# Patient Record
Sex: FEMALE | Race: WHITE | Employment: FULL TIME | URBAN - METROPOLITAN AREA
[De-identification: names, ages, dates, MRNs, and addresses within clinical notes are randomized per-mention and may not be internally consistent; named-entity substitution may affect disease eponyms.]

---

## 2017-09-22 ENCOUNTER — HOSPITAL ENCOUNTER (OUTPATIENT)
Dept: ULTRASOUND IMAGING | Age: 32
Discharge: HOME | End: 2017-09-22
Attending: FAMILY MEDICINE
Payer: COMMERCIAL

## 2017-09-22 DIAGNOSIS — R10.9 ABDOMINAL PAIN, UNSPECIFIED SITE: ICD-10-CM

## 2017-09-22 PROCEDURE — 76705 ECHO EXAM OF ABDOMEN: CPT

## 2017-09-26 ENCOUNTER — OFFICE VISIT (OUTPATIENT)
Dept: NEUROLOGY | Age: 32
End: 2017-09-26

## 2017-09-26 VITALS
TEMPERATURE: 97.4 F | BODY MASS INDEX: 26.08 KG/M2 | HEART RATE: 71 BPM | DIASTOLIC BLOOD PRESSURE: 70 MMHG | WEIGHT: 147.2 LBS | HEIGHT: 63 IN | RESPIRATION RATE: 18 BRPM | OXYGEN SATURATION: 99 % | SYSTOLIC BLOOD PRESSURE: 100 MMHG

## 2017-09-26 DIAGNOSIS — R51.9 CHRONIC DAILY HEADACHE: ICD-10-CM

## 2017-09-26 DIAGNOSIS — G43.011 INTRACTABLE MIGRAINE WITHOUT AURA AND WITH STATUS MIGRAINOSUS: Primary | ICD-10-CM

## 2017-09-26 DIAGNOSIS — Z86.69 HISTORY OF SEIZURES AS A CHILD: ICD-10-CM

## 2017-09-26 RX ORDER — ALBUTEROL SULFATE 0.83 MG/ML
SOLUTION RESPIRATORY (INHALATION) ONCE
COMMUNITY
End: 2017-10-19

## 2017-09-26 RX ORDER — TOPIRAMATE 25 MG/1
TABLET ORAL
Qty: 70 TAB | Refills: 0 | Status: SHIPPED | OUTPATIENT
Start: 2017-09-26 | End: 2017-11-02

## 2017-09-26 RX ORDER — TOPIRAMATE 100 MG/1
100 TABLET, FILM COATED ORAL
Qty: 30 TAB | Refills: 3 | Status: SHIPPED | OUTPATIENT
Start: 2017-10-20 | End: 2017-11-02

## 2017-09-26 RX ORDER — SUMATRIPTAN 100 MG/1
TABLET, FILM COATED ORAL
Qty: 9 TAB | Refills: 2 | Status: SHIPPED | OUTPATIENT
Start: 2017-09-26 | End: 2018-01-25 | Stop reason: ALTCHOICE

## 2017-09-26 NOTE — PATIENT INSTRUCTIONS
Stop all over the counter analgesics for headache control. Start Topamax. Start with 25 mg nightly for a week, then 50 mg nightly for a week, then 75 mg nightly for a week, then increase to 100 mg nightly thereafter. I have sent a prescription for the 100 mg tablets to be picked up next month. Imitrex prescription sent to pharmacy. Have report of MRI sent to the office. Complete tests and follow up after. Call office with any concerns.

## 2017-09-26 NOTE — MR AVS SNAPSHOT
Visit Information Date & Time Provider Department Dept. Phone Encounter #  
 9/26/2017  8:30 AM Earl Schilder, NP [facility] [phone] [id number] Upcoming Health Maintenance Date Due INFLUENZA AGE 9 TO ADULT 8/1/2017 PAP AKA CERVICAL CYTOLOGY 11/10/2017 DTaP/Tdap/Td series (2 - Td) 9/27/2024 Allergies as of 9/26/2017  Review Complete On: 9/26/2017 By: Bert Escalera LPN Severity Noted Reaction Type Reactions Latex  09/25/2014    Contact Dermatitis Dilantin [Phenytoin Sodium Extended] High 07/03/2013   Systemic Anaphylaxis Phenobarbital High 07/03/2013   Systemic Anaphylaxis Current Immunizations  Never Reviewed Name Date Tdap 9/27/2014 12:04 PM  
  
 Not reviewed this visit You Were Diagnosed With   
  
 Codes Comments Intractable migraine without aura and with status migrainosus    -  Primary ICD-10-CM: R29.157 
ICD-9-CM: 346.13 History of seizures as a child     ICD-10-CM: Z86.69 
ICD-9-CM: V12.49 Chronic daily headache     ICD-10-CM: R51 ICD-9-CM: 085. 0 Vitals BP Pulse Temp Resp Height(growth percentile) Weight(growth percentile) 100/70 71 97.4 °F (36.3 °C) (Temporal) 18 5' 3\" (1.6 m) 147 lb 3.2 oz (66.8 kg) LMP SpO2 Breastfeeding? BMI OB Status Smoking Status 08/08/2017 (Exact Date) 99% Unknown 26.08 kg/m2 Unknown Never Smoker BMI and BSA Data Body Mass Index Body Surface Area 26.08 kg/m 2 1.72 m 2 Preferred Pharmacy Pharmacy Name Phone [facility], [address] [place] [address] [phone] Your Updated Medication List  
  
   
This list is accurate as of: 9/26/17  9:09 AM.  Always use your most recent med list.  
  
  
  
  
 albuterol 2.5 mg /3 mL (0.083 %) nebulizer solution Commonly known as:  PROVENTIL VENTOLIN  
by Nebulization route once. OTHER Birth control pills---Lo lo estrin prenatal vit-calcium-iron-fa 27 mg iron- 1 mg Tab Commonly known as:  PRENATAL PLUS (CALCIUM CARB) Take 1 Tab by mouth daily. SUMAtriptan 100 mg tablet Commonly known as:  IMITREX Take one tablet at HA onset may repeat > 2 hours if needed. Max 2 tabs in 24 hours * topiramate 25 mg tablet Commonly known as:  TOPAMAX Take 25 mg qhs x 7 days, then take 50 mg qhs x 7 days, then take 75 mg qhs x 7 days, then take 100 mg qhs thereafter. * topiramate 100 mg tablet Commonly known as:  TOPAMAX Take 1 Tab by mouth nightly. Start taking on:  10/20/2017 * Notice: This list has 2 medication(s) that are the same as other medications prescribed for you. Read the directions carefully, and ask your doctor or other care provider to review them with you. Prescriptions Sent to Pharmacy Refills  
 topiramate (TOPAMAX) 25 mg tablet 0 Sig: Take 25 mg qhs x 7 days, then take 50 mg qhs x 7 days, then take 75 mg qhs x 7 days, then take 100 mg qhs thereafter. Class: Normal  
 Pharmacy: [place] [address] [employer] Ph #: [phone]  
 topiramate (TOPAMAX) 100 mg tablet 3 Starting on: 10/20/2017 Sig: Take 1 Tab by mouth nightly. Class: Normal  
 Pharmacy: [address] Ph #: [phone] Route: Oral  
 SUMAtriptan (IMITREX) 100 mg tablet 2 Sig: Take one tablet at HA onset may repeat > 2 hours if needed. Max 2 tabs in 24 hours Class: Normal  
 Pharmacy: [address] Ph #: [phone] To-Do List   
 09/26/2017 Neurology:  EEG SLEEP DEPRIVED   
  
 09/26/2017 Neurology:  EEG Patient Instructions Stop all over the counter analgesics for headache control. Start Topamax. Start with 25 mg nightly for a week, then 50 mg nightly for a week, then 75 mg nightly for a week, then increase to 100 mg nightly thereafter. I have sent a prescription for the 100 mg tablets to be picked up next month. Imitrex prescription sent to pharmacy. Have report of MRI sent to the office. Complete tests and follow up after. Call office with any concerns. Introducing [facility]! Dear Noah: Thank you for requesting a [employer] account. Our records indicate that you already have an active [employer] account. You can access your account anytime at [email or web address] Did you know that you can access your hospital and ER discharge instructions at any time in [employer]? You can also review all of your test results from your hospital stay or ER visit. Additional Information If you have questions, please visit the Frequently Asked Questions section of the [employer] website at [email or web address] Remember, [employer] is NOT to be used for urgent needs. For medical emergencies, dial 911. Now available from your iPhone and Android! Please provide this summary of care documentation to your next provider. Your primary care clinician is listed as Meghan White. If you have any questions after today's visit, please call [phone].

## 2017-09-26 NOTE — PROGRESS NOTES
[facility]  [address], [address], [place], [address]  [address] [place], [address]  Office:  [phone]  Fax: [phone]    Referring: Chema St MD    Chief Complaint   Patient presents with   [facility] Establish Care     NP: headaches. Patient states that she has been getting headaches everyday for the past 2 mos. This is a 27year old female who presents with headaches. Headaches started several months ago. She endorses having a headache most days. She can have 2-3 headache free days a week. She endorses a history of migraine headaches starting when she was a teenager. Today she described a headache that can  Be located bifrontal and this can radiate across the top of her head. She described the pain as a pulsating pain. This pain can increase to a pressure along with pulsating. The left side of the head is affected more than the right side. Associated with pain surrounding her left eye. She endorses light sensitivity. Denies sensitivity to sound. Endorses nausea. Denies vomiting. Denies focal deficits. When she would get headaches in the past Excedrin would work. She said she is not taking anything routinely over the counter because she does not like taking medications and it does not help. She has tried Advil PM but thinks this just makes her sleepy more than relieving headache pain. When she was a teenager she was placed on a migraine preventative. She remembers being on Topamax. Denies ill side effects and said she thinks she tolerated this well. She was recently given a prescription for Procardia two weeks by her PCP. She took this for two days and it made her feel \"off\" and she could not focus. She stopped taking this and notified her PCP's nurse. Pertinent history of seizures as a child. She said these started when she was in . She was given a diagnosis of epilepsy. She said seizure came on suddenly.  She would get an aura and have starring spells. She could hear and see during these episodes. She also recalls freezing spells in which she would get up and her body would stop. She denies overtly convulsing with those episodes, but said she would feel like she was shaking on the inside. She was finally given a diagnosis of epilepsy secondary to an episode of generalized convulsions in which she could not hear or see what was going on during this seizure. She was given Dilantin and phenobarbital which she did not tolerate. She was eventually placed on Tegretol. Unsure the dose. She was on for awhile and then eventually taken off. Seizures eventually stopped after 4-5 years. 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 \" She is unsure exactly what this lesion was, but was told this is what caused her seizures. She denies family history of seizures or history of head trauma. Currently denies occult seizure symptoms. She recently had an MRI W WO contrast done of the brain she does not have the report with her in office today. This was ordered by her PCP. She denies having EEGs done for many years. Past Medical History:   Diagnosis Date    Asthma     Neurological disorder        Past Surgical History:   Procedure Laterality Date    HX HEENT      tonsilectomy    HX OTHER SURGICAL      HX WISDOM TEETH EXTRACTION  2002       Current Outpatient Prescriptions   Medication Sig Dispense Refill    OTHER Birth control pills---Lo lo estrin      albuterol (PROVENTIL VENTOLIN) 2.5 mg /3 mL (0.083 %) nebulizer solution by Nebulization route once.  topiramate (TOPAMAX) 25 mg tablet Take 25 mg qhs x 7 days, then take 50 mg qhs x 7 days, then take 75 mg qhs x 7 days, then take 100 mg qhs thereafter. 70 Tab 0    [START ON 10/20/2017] topiramate (TOPAMAX) 100 mg tablet Take 1 Tab by mouth nightly. 30 Tab 3    SUMAtriptan (IMITREX) 100 mg tablet Take one tablet at HA onset may repeat > 2 hours if needed.  Max 2 tabs in 24 hours 9 Tab 2    prenatal vit-calcium-iron-fa (PRENATAL PLUS, CALCIUM CARB,) 27 mg iron- 1 mg tab Take 1 Tab by mouth daily. 100 Tab 10        Allergies   Allergen Reactions    Latex Contact Dermatitis    Dilantin [Phenytoin Sodium Extended] Anaphylaxis    Phenobarbital Anaphylaxis       Social History   Substance Use Topics    Smoking status: Never Smoker    Smokeless tobacco: Never Used    Alcohol use No       Family History   Problem Relation Age of Onset    Diabetes Mother     Diabetes Maternal Grandmother     Cancer Maternal Grandmother     Stroke Paternal Grandmother     Diabetes Maternal Aunt        Review of Systems:  Pertinent positives and negatives as noted otherwise comprehensive review is negative. Physical Examination:    Visit Vitals    /70    Pulse 71    Temp 97.4 °F (36.3 °C) (Temporal)    Resp 18    Ht 5' 3\" (1.6 m)    Wt 66.8 kg (147 lb 3.2 oz)    SpO2 99%    Breastfeeding Unknown    BMI 26.08 kg/m2     General:  Well defined, nourished, and groomed individual in no acute distress. Neck: Supple, nontender, no bruits. Heart: Regular rate and rhythm, no murmurs, rub, or gallop. Normal S1S2. Lungs:  Clear to auscultation bilaterally with occasional dry cough  Musculoskeletal:  Extremities revealed no edema. Psych:  Good mood and bright affect    Neurological Examination:     Mental Status:   Alert and oriented to person, place, and time with recent and remote memory intact. Attention span and concentration are normal. Speech is fluent with a full fund of knowledge. Cranial Nerves:    II, III, IV, VI:  Visual acuity grossly intact. Visual fields are normal.    Pupils are equal, round, and reactive to light and accommodation. Extra-ocular movements are full and fluid. Fundoscopic exam was benign, no ptosis or nystagmus. V-XII: Hearing is grossly intact. Facial features are symmetric. The palate rises symmetrically and the tongue protrudes midline. Sternocleidomastoids 5/5. Motor Examination: Normal tone, bulk, and strength, 5/5 muscle strength throughout. No cogwheel rigidity or clonus present. Sensory exam:  Sensory examination is intact to primary modalities and there is no lateralization of sensation. Coordination:  Finger to nose was normal.   No resting or intention tremor    Gait and Station:  Steady gait. Normal arm swing. No Rhomberg or pronator drift. No muscle wasting or fasiculations noted. Reflexes:  DTRs 2+ throughout. Toes downgoing. Impression/Plan  Sarah Song is a 27 y.o. female whose history and physical are consistent with migraine and history of seizure in childhood. Patient endorses nearly daily headaches. Tolerated Topamax in the past.  We will start this in a customary fashion with goal of 100 mg qhs. Imitrex PRN. Discussed medications, indication, side effects, and dosing. Patient verbalized understanding. Patient described history of seizure. Obtain records or recent MRI of the brain. Obtain routine and sleep deprived EEGs. We will follow up after tests. Call office with any concerns. Diagnoses and all orders for this visit:    1. Intractable migraine without aura and with status migrainosus  -     EEG; Future  -     EEG SLEEP DEPRIVED; Future    2. History of seizures as a child  -     EEG; Future  -     EEG SLEEP DEPRIVED; Future    3. Chronic daily headache  -     EEG; Future  -     EEG SLEEP DEPRIVED; Future    Other orders  -     topiramate (TOPAMAX) 25 mg tablet; Take 25 mg qhs x 7 days, then take 50 mg qhs x 7 days, then take 75 mg qhs x 7 days, then take 100 mg qhs thereafter.  -     topiramate (TOPAMAX) 100 mg tablet; Take 1 Tab by mouth nightly. -     SUMAtriptan (IMITREX) 100 mg tablet; Take one tablet at HA onset may repeat > 2 hours if needed. Max 2 tabs in 24 hours      Signed By: Jalen Evans NP    This note will not be viewable in [address]     This note was created using voice recognition software. Despite editing, there may be syntax errors.

## 2017-10-03 ENCOUNTER — TELEPHONE (OUTPATIENT)
Dept: NEUROLOGY | Age: 32
End: 2017-10-03

## 2017-10-04 ENCOUNTER — HOSPITAL ENCOUNTER (OUTPATIENT)
Dept: NEUROLOGY | Age: 32
Discharge: HOME | End: 2017-10-04
Attending: NURSE PRACTITIONER
Payer: COMMERCIAL

## 2017-10-04 ENCOUNTER — TELEPHONE (OUTPATIENT)
Dept: NEUROLOGY | Age: 32
End: 2017-10-04

## 2017-10-04 DIAGNOSIS — C71.1 GLIOMA OF FRONTAL LOBE (HCC): Primary | ICD-10-CM

## 2017-10-04 DIAGNOSIS — Z86.69 HISTORY OF SEIZURES AS A CHILD: ICD-10-CM

## 2017-10-04 DIAGNOSIS — R51.9 CHRONIC DAILY HEADACHE: ICD-10-CM

## 2017-10-04 DIAGNOSIS — G43.011 INTRACTABLE MIGRAINE WITHOUT AURA AND WITH STATUS MIGRAINOSUS: ICD-10-CM

## 2017-10-04 PROCEDURE — 95819 EEG AWAKE AND ASLEEP: CPT

## 2017-10-04 NOTE — TELEPHONE ENCOUNTER
Spoke to patient and informed her that she is being [facility] Dr. Ren Oswald. Patient verbalized understanding of the information given.

## 2017-10-04 NOTE — TELEPHONE ENCOUNTER
----- Message from Haley Garza NP sent at 10/4/2017  8:24 AM EDT -----        I will send Ms. Ayon for evaluation by Dr. Brian Escobedo for known brain lesion.       Thank you

## 2017-10-05 NOTE — PROCEDURES
[place]    Name:  Kait Clay  MR#:  [id number]  :  1985  Account #:  [de-identified]  Date of Adm:  10/04/2017  Date of Service:  10/04/2017      INDICATIONS: A 39-year-old female who presents from Dr. Alize Mccallum in terms of electrocortical evaluation complaining of  headaches. CURRENT MEDICATIONS:  1. Prednisone. 2. Albuterol. 3. Multivitamins. 4. Claritin. 5. Advair. This EEG was performed following sleep deprivation. It was performed  as an outpatient, utilizing both referential and differential montages as  well as International 10-20 electrode placement system on an 18  change EEG instrument. the patient was initially awake. She demonstrates up to a posteriorly dominant and reactive alpha  rhythm up to 9.5 Hz. She spends much of the recording asleep. Activation symmetric sleep patterns. There were no focal lateralized or  abnormal paroxysmal discharges noted. On single channel EKG  monitoring, no cardiac ectopy was noted. ELECTROENCEPHALOGRAM INTERPRETATION: Normal awake  and primarily asleep recording for age.         MD Misbah Key / Aissatou Hackett  D:  10/04/2017   15:48  T:  10/04/2017   21:23  Job #:  [id number]

## 2017-10-11 ENCOUNTER — HOSPITAL ENCOUNTER (OUTPATIENT)
Dept: NEUROLOGY | Age: 32
Discharge: HOME | End: 2017-10-11
Attending: NURSE PRACTITIONER
Payer: COMMERCIAL

## 2017-10-11 DIAGNOSIS — R51.9 CHRONIC DAILY HEADACHE: ICD-10-CM

## 2017-10-11 DIAGNOSIS — G43.011 INTRACTABLE MIGRAINE WITHOUT AURA AND WITH STATUS MIGRAINOSUS: ICD-10-CM

## 2017-10-11 DIAGNOSIS — Z86.69 HISTORY OF SEIZURES AS A CHILD: ICD-10-CM

## 2017-10-11 PROCEDURE — 95816 EEG AWAKE AND DROWSY: CPT

## 2017-10-13 NOTE — PROCEDURES
[place]    Name:  Nena Govea  MR#:  [id number]  :  1985  Account #:  [de-identified]  Date of Adm:  10/11/2017  Date of Service:  10/11/2017      ELECTROENCEPHALOGRAM NUMBERLamount Overall . REFERRING: Danie Blake NP    CLINICAL: This is an apparently wakeful EEG on this 27year-old  patient presenting with headaches several months ago. MEDICATIONS  1. Prednisone. 2. Albuterol. 3. Multivitamins. 4. Claritin. 5. Advair. ELECTROENCEPHALOGRAM REPORT: The predominant wakeful  background consists of 15-25 microvolts, sinusoidal and symmetrical,  9 to 10 Hz waves which attenuate well with eye opening. Bifrontal 3 to  5 microvolts, 16-20 Hz activity is appreciated, which is symmetrical in  its occurrence. Step-flash photic stimulation was performed that caused symmetrical  driving between 3 and 12 flashes per second. Hyperventilation was  performed that does not change the recording. IMPRESSION: This is a normal wakeful electroencephalogram. No  epileptiform or focal abnormality was identified.         Daylin Swann MD    MG / YANNA  D:  10/13/2017   17:22  T:  10/13/2017   18:52  Job #:  [id number]

## 2017-10-18 ENCOUNTER — HOSPITAL ENCOUNTER (OUTPATIENT)
Dept: NUCLEAR MEDICINE | Age: 32
Discharge: HOME | End: 2017-10-18
Attending: INTERNAL MEDICINE
Payer: COMMERCIAL

## 2017-10-18 VITALS — BODY MASS INDEX: 24.8 KG/M2 | WEIGHT: 140 LBS

## 2017-10-18 DIAGNOSIS — R10.11 ABDOMINAL PAIN, RIGHT UPPER QUADRANT: ICD-10-CM

## 2017-10-18 PROCEDURE — 74011250636 HC RX REV CODE- 250/636: Performed by: INTERNAL MEDICINE

## 2017-10-18 PROCEDURE — 78227 HEPATOBIL SYST IMAGE W/DRUG: CPT

## 2017-10-18 PROCEDURE — 74011000258 HC RX REV CODE- 258: Performed by: INTERNAL MEDICINE

## 2017-10-18 RX ORDER — SODIUM CHLORIDE 9 MG/ML
50 INJECTION, SOLUTION INTRAVENOUS
Status: COMPLETED | OUTPATIENT
Start: 2017-10-18 | End: 2017-10-18

## 2017-10-18 RX ADMIN — SODIUM CHLORIDE 50 ML/HR: 900 INJECTION, SOLUTION INTRAVENOUS at 13:41

## 2017-10-18 RX ADMIN — SINCALIDE 1.27 MCG: 5 INJECTION, POWDER, LYOPHILIZED, FOR SOLUTION INTRAVENOUS at 13:41

## 2017-10-19 ENCOUNTER — HOSPITAL ENCOUNTER (EMERGENCY)
Age: 32
Discharge: HOME | End: 2017-10-19
Attending: EMERGENCY MEDICINE
Payer: COMMERCIAL

## 2017-10-19 ENCOUNTER — APPOINTMENT (OUTPATIENT)
Dept: GENERAL RADIOLOGY | Age: 32
End: 2017-10-19
Attending: PHYSICIAN ASSISTANT
Payer: COMMERCIAL

## 2017-10-19 VITALS
RESPIRATION RATE: 22 BRPM | SYSTOLIC BLOOD PRESSURE: 112 MMHG | DIASTOLIC BLOOD PRESSURE: 62 MMHG | WEIGHT: 140 LBS | BODY MASS INDEX: 24.8 KG/M2 | OXYGEN SATURATION: 100 % | HEART RATE: 104 BPM | HEIGHT: 63 IN | TEMPERATURE: 98.3 F

## 2017-10-19 DIAGNOSIS — J45.901 MODERATE ASTHMA WITH ACUTE EXACERBATION, UNSPECIFIED WHETHER PERSISTENT: Primary | ICD-10-CM

## 2017-10-19 LAB
ALBUMIN SERPL-MCNC: 4.2 G/DL (ref 3.4–5)
ALBUMIN/GLOB SERPL: 1.1 {RATIO} (ref 0.8–1.7)
ALP SERPL-CCNC: 56 U/L (ref 45–117)
ALT SERPL-CCNC: 33 U/L (ref 13–56)
ANION GAP SERPL CALC-SCNC: 16 MMOL/L (ref 3–18)
AST SERPL-CCNC: 27 U/L (ref 15–37)
BASOPHILS # BLD: 0 K/UL (ref 0–0.06)
BASOPHILS NFR BLD: 0 % (ref 0–2)
BILIRUB SERPL-MCNC: 0.3 MG/DL (ref 0.2–1)
BUN SERPL-MCNC: 14 MG/DL (ref 7–18)
BUN/CREAT SERPL: 15 (ref 12–20)
CALCIUM SERPL-MCNC: 9.2 MG/DL (ref 8.5–10.1)
CHLORIDE SERPL-SCNC: 104 MMOL/L (ref 100–108)
CO2 SERPL-SCNC: 21 MMOL/L (ref 21–32)
CREAT SERPL-MCNC: 0.96 MG/DL (ref 0.6–1.3)
DIFFERENTIAL METHOD BLD: NORMAL
EOSINOPHIL # BLD: 0.1 K/UL (ref 0–0.4)
EOSINOPHIL NFR BLD: 1 % (ref 0–5)
ERYTHROCYTE [DISTWIDTH] IN BLOOD BY AUTOMATED COUNT: 13.2 % (ref 11.6–14.5)
GLOBULIN SER CALC-MCNC: 4 G/DL (ref 2–4)
GLUCOSE SERPL-MCNC: 117 MG/DL (ref 74–99)
HCG SERPL QL: NEGATIVE
HCT VFR BLD AUTO: 40.6 % (ref 35–45)
HGB BLD-MCNC: 13.8 G/DL (ref 12–16)
LYMPHOCYTES # BLD: 2.3 K/UL (ref 0.9–3.6)
LYMPHOCYTES NFR BLD: 27 % (ref 21–52)
MCH RBC QN AUTO: 31.4 PG (ref 24–34)
MCHC RBC AUTO-ENTMCNC: 34 G/DL (ref 31–37)
MCV RBC AUTO: 92.3 FL (ref 74–97)
MONOCYTES # BLD: 0.4 K/UL (ref 0.05–1.2)
MONOCYTES NFR BLD: 4 % (ref 3–10)
NEUTS SEG # BLD: 6 K/UL (ref 1.8–8)
NEUTS SEG NFR BLD: 68 % (ref 40–73)
PLATELET # BLD AUTO: 309 K/UL (ref 135–420)
PMV BLD AUTO: 9.8 FL (ref 9.2–11.8)
POTASSIUM SERPL-SCNC: 3 MMOL/L (ref 3.5–5.5)
PROT SERPL-MCNC: 8.2 G/DL (ref 6.4–8.2)
RBC # BLD AUTO: 4.4 M/UL (ref 4.2–5.3)
SODIUM SERPL-SCNC: 141 MMOL/L (ref 136–145)
WBC # BLD AUTO: 8.8 K/UL (ref 4.6–13.2)

## 2017-10-19 PROCEDURE — 85025 COMPLETE CBC W/AUTO DIFF WBC: CPT | Performed by: PHYSICIAN ASSISTANT

## 2017-10-19 PROCEDURE — 74011000250 HC RX REV CODE- 250: Performed by: PHYSICIAN ASSISTANT

## 2017-10-19 PROCEDURE — 74011000250 HC RX REV CODE- 250: Performed by: EMERGENCY MEDICINE

## 2017-10-19 PROCEDURE — 96365 THER/PROPH/DIAG IV INF INIT: CPT

## 2017-10-19 PROCEDURE — 94640 AIRWAY INHALATION TREATMENT: CPT

## 2017-10-19 PROCEDURE — 71010 XR CHEST PORT: CPT

## 2017-10-19 PROCEDURE — 96375 TX/PRO/DX INJ NEW DRUG ADDON: CPT

## 2017-10-19 PROCEDURE — 77030013140 HC MSK NEB VYRM -A

## 2017-10-19 PROCEDURE — 74011250636 HC RX REV CODE- 250/636: Performed by: PHYSICIAN ASSISTANT

## 2017-10-19 PROCEDURE — 99283 EMERGENCY DEPT VISIT LOW MDM: CPT

## 2017-10-19 PROCEDURE — 84703 CHORIONIC GONADOTROPIN ASSAY: CPT | Performed by: PHYSICIAN ASSISTANT

## 2017-10-19 PROCEDURE — 80053 COMPREHEN METABOLIC PANEL: CPT | Performed by: PHYSICIAN ASSISTANT

## 2017-10-19 RX ORDER — ALBUTEROL SULFATE 0.83 MG/ML
2.5 SOLUTION RESPIRATORY (INHALATION)
Qty: 24 EACH | Refills: 0 | Status: SHIPPED | OUTPATIENT
Start: 2017-10-19

## 2017-10-19 RX ORDER — MAGNESIUM SULFATE HEPTAHYDRATE 40 MG/ML
2 INJECTION, SOLUTION INTRAVENOUS ONCE
Status: COMPLETED | OUTPATIENT
Start: 2017-10-19 | End: 2017-10-19

## 2017-10-19 RX ORDER — PREDNISONE 10 MG/1
TABLET ORAL
Qty: 21 TAB | Refills: 0 | Status: SHIPPED | OUTPATIENT
Start: 2017-10-19 | End: 2017-10-25 | Stop reason: ALTCHOICE

## 2017-10-19 RX ORDER — IPRATROPIUM BROMIDE AND ALBUTEROL SULFATE 2.5; .5 MG/3ML; MG/3ML
3 SOLUTION RESPIRATORY (INHALATION)
Status: COMPLETED | OUTPATIENT
Start: 2017-10-19 | End: 2017-10-19

## 2017-10-19 RX ADMIN — SODIUM CHLORIDE 1000 ML: 900 INJECTION, SOLUTION INTRAVENOUS at 10:32

## 2017-10-19 RX ADMIN — IPRATROPIUM BROMIDE AND ALBUTEROL SULFATE 3 ML: .5; 3 SOLUTION RESPIRATORY (INHALATION) at 09:52

## 2017-10-19 RX ADMIN — MAGNESIUM SULFATE HEPTAHYDRATE 2 G: 40 INJECTION, SOLUTION INTRAVENOUS at 10:32

## 2017-10-19 RX ADMIN — IPRATROPIUM BROMIDE AND ALBUTEROL SULFATE 3 ML: .5; 3 SOLUTION RESPIRATORY (INHALATION) at 11:42

## 2017-10-19 RX ADMIN — IPRATROPIUM BROMIDE AND ALBUTEROL SULFATE 3 ML: .5; 3 SOLUTION RESPIRATORY (INHALATION) at 10:17

## 2017-10-19 RX ADMIN — METHYLPREDNISOLONE SODIUM SUCCINATE 125 MG: 125 INJECTION, POWDER, FOR SOLUTION INTRAMUSCULAR; INTRAVENOUS at 10:32

## 2017-10-19 NOTE — ED TRIAGE NOTES
Patient with history of asthma. Patient presents to the ER hyperventilating and states that she has used her inhaler 3 times. Patient able to stop hyperventilating to tell nurse that she was hyperventilating. Patient coughing. Sepsis Screening completed    (  )Patient meets SIRS criteria. (x  )Patient does not meet SIRS criteria.       SIRS Criteria is achieved when two or more of the following are present   Temperature < 96.8°F (36°C) or > 100.9°F (38.3°C)   Heart Rate > 90 beats per minute   Respiratory Rate > 20 breaths per minute   WBC count > 12,000 or <4,000 or > 10% bands

## 2017-10-19 NOTE — ED PROVIDER NOTES
HPI Comments: 9:58 AM    Marcie Del Rosario is a 27 y.o. female with PMHx of asthma presenting to the ED C/O constant wheezing and SOB onset ~1 hour ago. Pain rated 10/10. Pt states she feels her asthma is flaring up. 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 Pt tried using her inhaler with no relief. Pt was admitted to [facility] 2 weeks ago for similar, finished her steroids 1 week ago. Pt has never been intubated for her asthma. Pt takes albuterol and Advair. Pt reports a Hx of epilepsy as a child, and is followed by neurology at [facility]. Pt with allergy to Dilantin and Phenobarbital. Pt denies recent cough, fever, and any other symptoms or complaints. LMP ~2 days ago. Written by Lorna Christie ED Scribe, as dictated by Nomi Mathews PA-C     Patient is a 27 y.o. female presenting with wheezing. The history is provided by the patient. No  was used. Wheezing    This is a new problem. The current episode started 1 to 2 hours ago. The problem occurs constantly. Pertinent negatives include no chest pain, no fever, no vomiting, no headaches, no sore throat, no cough and no rash. The problem's precipitants include fumes. She has had prior hospitalizations. Her past medical history is significant for asthma. Past Medical History:   Diagnosis Date    Asthma     Neurological disorder        Past Surgical History:   Procedure Laterality Date    HX HEENT      tonsilectomy    HX OTHER SURGICAL      HX WISDOM TEETH EXTRACTION  2002         Family History:   Problem Relation Age of Onset    Diabetes Mother     Diabetes Maternal Grandmother     Cancer Maternal Grandmother     Stroke Paternal Grandmother     Diabetes Maternal Aunt        Social History     Social History    Marital status:      Spouse name: N/A    Number of children: N/A    Years of education: N/A     Occupational History    Not on file.      Social History Main Topics    Smoking status: Never Smoker    Smokeless tobacco: Never Used    Alcohol use No    Drug use: No    Sexual activity: Not on file     Other Topics Concern    Not on file     Social History Narrative         ALLERGIES: Latex; Dilantin [phenytoin sodium extended]; and Phenobarbital    Review of Systems   Constitutional: Negative for chills and fever. HENT: Negative for congestion, facial swelling, sore throat and trouble swallowing. Respiratory: Positive for shortness of breath and wheezing. Negative for cough. Cardiovascular: Negative for chest pain. Gastrointestinal: Negative for nausea and vomiting. Musculoskeletal: Negative for myalgias. Skin: Negative for rash. Allergic/Immunologic: Positive for environmental allergies. Neurological: Negative for dizziness, light-headedness and headaches. Hematological: Negative for adenopathy. Vitals:    10/19/17 0958 10/19/17 1000 10/19/17 1100 10/19/17 1200   BP: 122/76 117/72 117/61 112/62   Pulse: (!) 104      Resp: 22      Temp: 98.3 °F (36.8 °C)      SpO2: 100% 100% 100% 100%   Weight: 63.5 kg (140 lb)      Height: 5' 3\" (1.6 m)               Physical Exam   Constitutional: She is oriented to person, place, and time. She appears well-developed and well-nourished. No distress.  female in moderate resp distress. Audible wheezing. Acc muscle use   HENT:   Head: Normocephalic and atraumatic. Right Ear: External ear normal. No swelling or tenderness. Tympanic membrane is not perforated, not erythematous and not bulging. Left Ear: External ear normal. No swelling or tenderness. Tympanic membrane is not perforated, not erythematous and not bulging. Nose: Nose normal. No mucosal edema or rhinorrhea. Right sinus exhibits no maxillary sinus tenderness and no frontal sinus tenderness. Left sinus exhibits no maxillary sinus tenderness and no frontal sinus tenderness.    Mouth/Throat: Uvula is midline, oropharynx is clear and moist and mucous membranes are normal. No oral lesions. No trismus in the jaw. No dental abscesses or uvula swelling. No oropharyngeal exudate, posterior oropharyngeal edema, posterior oropharyngeal erythema or tonsillar abscesses. Eyes: Conjunctivae are normal.   Neck: Normal range of motion. Cardiovascular: Normal rate, regular rhythm, normal heart sounds and intact distal pulses. Exam reveals no gallop and no friction rub. No murmur heard. Pulmonary/Chest: Accessory muscle usage present. Tachypnea noted. She is in respiratory distress. She has decreased breath sounds in the right lower field and the left lower field. She has wheezes in the right upper field, the right lower field and the left lower field. She has no rhonchi. She has no rales. Musculoskeletal: Normal range of motion. She exhibits no edema. Neurological: She is alert and oriented to person, place, and time. Skin: Skin is warm and dry. No rash noted. She is not diaphoretic. No erythema. Psychiatric: Judgment normal. Her mood appears anxious. Nursing note and vitals reviewed. RESULTS:    PULSE OXIMETRY NOTE:  Pulse-ox is 100% on room air  Interpretation: Normal       XR CHEST PORT   Final Result         11:42 AM  RADIOLOGY FINDINGS  Chest X-ray shows no acute process  Pending review by Radiologist  Recorded by VÍCTOR Moonibhaider, as dictated by [email or web address] FILI Ibrahim    Labs Reviewed   METABOLIC PANEL, COMPREHENSIVE - Abnormal; Notable for the following:        Result Value    Potassium 3.0 (*)     Glucose 117 (*)     All other components within normal limits   CBC WITH AUTOMATED DIFF   HCG QL SERUM       No results found for this or any previous visit (from the past 12 hour(s)). MDM  Number of Diagnoses or Management Options  Moderate asthma with acute exacerbation, unspecified whether persistent:   Diagnosis management comments: Asthma, PNA, PTX, CHF, COPD.  Doubt cardiac or PE       Amount and/or Complexity of Data Reviewed  Clinical lab tests: ordered and reviewed  Tests in the radiology section of CPT®: ordered and reviewed (CXR)  Independent visualization of images, tracings, or specimens: yes (CXR)      ED Course     Medications   albuterol-ipratropium (DUO-NEB) 2.5 MG-0.5 MG/3 ML (3 mL Nebulization Given 10/19/17 0952)   methylPREDNISolone (PF) (SOLU-MEDROL) injection 125 mg (125 mg IntraVENous Given 10/19/17 1032)   magnesium sulfate 2 g/50 ml IVPB (premix or compounded) (0 g IntraVENous IV Completed 10/19/17 1132)   sodium chloride 0.9 % bolus infusion 1,000 mL (0 mL IntraVENous IV Completed 10/19/17 1132)   albuterol-ipratropium (DUO-NEB) 2.5 MG-0.5 MG/3 ML (3 mL Nebulization Given 10/19/17 1017)   albuterol-ipratropium (DUO-NEB) 2.5 MG-0.5 MG/3 ML (3 mL Nebulization Given 10/19/17 1142)      Procedures    PROGRESS NOTE:  9:58 AM  Initial assessment performed. PROGRESS NOTE:   11:19 AM  Pt has been re-examined by Pili Hdez PA-C. Pt feels much better. Still mildly anxious. Wheezing has resolved. Moving air quite well. No hypoxia, no accessory of muscle use after tx. Has mild bronchospasms at times. She would like a 3rd neb and will likely discharge home as responded well to tx in ED. PROGRESS NOTE:   12:10 PM  Pt has been re-examined by Pili Hdez PA-C. Pt feels much better and feels comfortable going home. DISCHARGE NOTE:   12:12 PM  Pt has been reexamined. Patient has no new complaints, changes, or physical findings. Care plan outlined and precautions discussed. Results were reviewed with the patient. All medications were reviewed with the patient; will d/c home with Prednisone and Albuterol. All of pt's questions and concerns were addressed. Patient was instructed and agrees to follow up with PCP, as well as to return to the ED upon further deterioration. Patient is ready to go home. CLINICAL IMPRESSION:    1.  Moderate asthma with acute exacerbation, unspecified whether persistent PLAN: DISCHARGE HOME    Follow-up Information     Follow up With Details Comments Contact Info    Henry Painting MD Schedule an appointment as soon as possible for a visit in 2 days For primary care follow up [address] [facility]      THE [facility] EMERGENCY DEPT  As needed, If symptoms worsen [address] [address]  [phone]          Discharge Medication List as of 10/19/2017 12:10 PM      START taking these medications    Details   predniSONE (STERAPRED DS) 10 mg dose pack Take with food. Indications: Asthma Exacerbation, Print, Disp-21 Tab, R-0         CONTINUE these medications which have CHANGED    Details   albuterol (PROVENTIL VENTOLIN) 2.5 mg /3 mL (0.083 %) nebulizer solution 3 mL by Nebulization route every four (4) hours as needed for Wheezing., Print, Disp-24 Each, R-0         CONTINUE these medications which have NOT CHANGED    Details   OTHER Birth control pills---Lo lo estrin, Historical Med      !! topiramate (TOPAMAX) 25 mg tablet Take 25 mg qhs x 7 days, then take 50 mg qhs x 7 days, then take 75 mg qhs x 7 days, then take 100 mg qhs thereafter., Normal, Disp-70 Tab, R-0      !! topiramate (TOPAMAX) 100 mg tablet Take 1 Tab by mouth nightly., Normal, Disp-30 Tab, R-3      SUMAtriptan (IMITREX) 100 mg tablet Take one tablet at HA onset may repeat > 2 hours if needed. Max 2 tabs in 24 hours, Normal, Disp-9 Tab, R-2      prenatal vit-calcium-iron-fa (PRENATAL PLUS, CALCIUM CARB,) 27 mg iron- 1 mg tab Take 1 Tab by mouth daily. , Print, Disp-100 Tab, R-10       !! - Potential duplicate medications found. Please discuss with provider. ATTESTATION:  This note is prepared by Eugenia Smith, acting as Scribe for Augusta Montes PA-C. Augusta Montes PA-C: The scribe's documentation has been prepared under my direction and personally reviewed by me in its entirety.  I confirm that the note above accurately reflects all work, treatment, procedures, and medical decision making performed by me. 11:20 AM  I have spent 30 minutes of critical care time involved in lab review, consultations with specialist, family decision-making, and documentation. During this entire length of time I was immediately available to the patient. Critical Care: The reason for providing this level of medical care for this critically ill patient was due a critical illness that impaired one or more vital organ systems such that there was a high probability of imminent or life threatening deterioration in the patients condition. This care involved high complexity decision making to assess, manipulate, and support vital system functions, to treat this degreee vital organ system failure and to prevent further life threatening deterioration of the patients condition.

## 2017-10-19 NOTE — LETTER
[facility] [place] 
[facility] 
[address] [id number] [phone] Work/School Note Date: 10/19/2017 To Whom It May concern: 
 
Thera Sicard was seen and treated today in the emergency room by the following provider(s): 
Attending Provider: Zak Dee MD 
Physician Assistant: Earl Gonzalez PA-C. Thera Sicard may return to work on 10/20/2017. Sincerely, Mahesh Rothman PA-C

## 2017-10-19 NOTE — DISCHARGE INSTRUCTIONS

## 2017-10-20 ENCOUNTER — TELEPHONE (OUTPATIENT)
Dept: NEUROLOGY | Age: 32
End: 2017-10-20

## 2017-10-20 NOTE — TELEPHONE ENCOUNTER
Patient came in on 10/18 inquiring about her referral to neurosurgery. Patient was given the number to contact office. Patient states when she contacted office, she was told they do not have a referral and something along the lines of \"they cant help her\". Patient states she is at work ut to please leave her a voicemail and she'll call back as soon as she can. Please advise.

## 2017-10-23 NOTE — TELEPHONE ENCOUNTER
Left message for patient to inform her that all of her records and referral information was faxed to Dr. Hamida Moore office the beginning of October. Informed patient that I will refax all information tomorrow when I get to the [facility]. Informed patient to call back with any questions/concerns.

## 2017-10-24 NOTE — TELEPHONE ENCOUNTER
Spoke to Anel at Dr. Jaja Rhodes office to schedule appt for patient to be seen. Appt scheduled for 11/15/17 at 1000am with an arrival time of 930am.  Patient is to bring her disc of the imaging she had done. Patient was informed of the above appt information and verbalized understanding. Patient states that she has the disc to take with her. Records will be refaxed.

## 2017-10-25 ENCOUNTER — OFFICE VISIT (OUTPATIENT)
Dept: NEUROLOGY | Age: 32
End: 2017-10-25

## 2017-10-25 VITALS
TEMPERATURE: 97.5 F | WEIGHT: 153.4 LBS | SYSTOLIC BLOOD PRESSURE: 90 MMHG | RESPIRATION RATE: 16 BRPM | HEART RATE: 76 BPM | BODY MASS INDEX: 27.18 KG/M2 | OXYGEN SATURATION: 99 % | DIASTOLIC BLOOD PRESSURE: 60 MMHG | HEIGHT: 63 IN

## 2017-10-25 DIAGNOSIS — Z86.69 HISTORY OF SEIZURES AS A CHILD: ICD-10-CM

## 2017-10-25 DIAGNOSIS — F32.A DEPRESSION, UNSPECIFIED DEPRESSION TYPE: Primary | ICD-10-CM

## 2017-10-25 DIAGNOSIS — C71.1 GLIOMA OF FRONTAL LOBE (HCC): ICD-10-CM

## 2017-10-25 DIAGNOSIS — G43.011 INTRACTABLE MIGRAINE WITHOUT AURA AND WITH STATUS MIGRAINOSUS: ICD-10-CM

## 2017-10-25 RX ORDER — FLUTICASONE PROPIONATE AND SALMETEROL 250; 50 UG/1; UG/1
1 POWDER RESPIRATORY (INHALATION) DAILY
COMMUNITY
End: 2018-07-25 | Stop reason: DRUGHIGH

## 2017-10-25 RX ORDER — LORATADINE 10 MG/1
10 TABLET ORAL
COMMUNITY
End: 2018-07-25

## 2017-10-25 NOTE — MR AVS SNAPSHOT
Visit Information Date & Time Provider Department Dept. Phone Encounter #  
 10/25/2017 10:15 AM Khadar Lindo NP [address] [phone] [id number] Follow-up Instructions Return in about 3 months (around 1/25/2018). Your Appointments 10/27/2017  2:00 PM  
Office Visit with Adiel Sandy MD  
[address] [facility]) Appt Note: gallbladder / Dr Humphrey Hernández ([address]) [address] [facility] [address] [facility] [address]  
[phone]  
  
   
 [address] [facility] [address] [facility] [address] Upcoming Health Maintenance Date Due Pneumococcal 19-64 Medium Risk (1 of 1 - PPSV23) 11/7/2003 INFLUENZA AGE 9 TO ADULT 8/1/2017 PAP AKA CERVICAL CYTOLOGY 11/10/2017 DTaP/Tdap/Td series (2 - Td) 9/27/2024 Allergies as of 10/25/2017  Review Complete On: 10/25/2017 By: Flynn Hernandez LPN Severity Noted Reaction Type Reactions Latex  09/25/2014    Contact Dermatitis Dilantin [Phenytoin Sodium Extended] High 07/03/2013   Systemic Anaphylaxis Phenobarbital High 07/03/2013   Systemic Anaphylaxis Current Immunizations  Never Reviewed Name Date Tdap 9/27/2014 12:04 PM  
  
 Not reviewed this visit You Were Diagnosed With   
  
 Codes Comments Depression, unspecified depression type    -  Primary ICD-10-CM: F32.9 ICD-9-CM: 274 History of seizures as a child     ICD-10-CM: Z86.69 
ICD-9-CM: V12.49 Intractable migraine without aura and with status migrainosus     ICD-10-CM: G43.011 
ICD-9-CM: 346.13 Vitals BP Pulse Temp Resp Height(growth percentile) Weight(growth percentile) 90/60 76 97.5 °F (36.4 °C) (Temporal) 16 5' 3\" (1.6 m) 153 lb 6.4 oz (69.6 kg) LMP SpO2 BMI OB Status Smoking Status 10/05/2017 99% 27.17 kg/m2 Unknown Never Smoker BMI and BSA Data Body Mass Index Body Surface Area  
 27.17 kg/m 2 1.76 m 2 Preferred Pharmacy Pharmacy Name Phone [facility] [address] [facility] [phone] Your Updated Medication List  
  
   
This list is accurate as of: 10/25/17 10:48 AM.  Always use your most recent med list.  
  
  
  
  
 ADVAIR DISKUS 250-50 mcg/dose diskus inhaler Generic drug:  fluticasone-salmeterol Take 1 Puff by inhalation every twelve (12) hours. albuterol 2.5 mg /3 mL (0.083 %) nebulizer solution Commonly known as:  PROVENTIL VENTOLIN  
3 mL by Nebulization route every four (4) hours as needed for Wheezing. CLARITIN 10 mg tablet Generic drug:  loratadine Take 10 mg by mouth. OTHER Birth control pills---Lo lo estrin  
  
 prenatal vit-calcium-iron-fa 27 mg iron- 1 mg Tab Commonly known as:  PRENATAL PLUS (CALCIUM CARB) Take 1 Tab by mouth daily. SUMAtriptan 100 mg tablet Commonly known as:  IMITREX Take one tablet at HA onset may repeat > 2 hours if needed. Max 2 tabs in 24 hours * topiramate 25 mg tablet Commonly known as:  TOPAMAX Take 25 mg qhs x 7 days, then take 50 mg qhs x 7 days, then take 75 mg qhs x 7 days, then take 100 mg qhs thereafter. * topiramate 100 mg tablet Commonly known as:  TOPAMAX Take 1 Tab by mouth nightly. * Notice: This list has 2 medication(s) that are the same as other medications prescribed for you. Read the directions carefully, and ask your doctor or other care provider to review them with you. We Performed the Following REFERRAL TO PSYCHIATRY [REF91 Custom] Comments:  
 Please eval for depression. Assist with CBT services. Follow-up Instructions Return in about 3 months (around 1/25/2018). Referral Information Referral ID Referred By Referred To [id number] Bradley Mckenna Not Available Visits Status Start Date End Date 1 New Request 10/25/17 10/25/18  If your referral has a status of pending review or denied, additional information will be sent to support the outcome of this decision. Patient Instructions Maintain appointment with Dr. Brian Escobedo. Start Topamax as we described with goal of 100 mg nightly. Take Imitrex as needed. Take as indicated. I have placed referral for psychiatry. Follow up in 3 months. Introducing [facility]! Dear Chuy Stout: Thank you for requesting a Yodio account. Our records indicate that you already have an active Yodio account. You can access your account anytime at [email or web address] Did you know that you can access your hospital and ER discharge instructions at any time in Yodio? You can also review all of your test results from your hospital stay or ER visit. Additional Information If you have questions, please visit the Frequently Asked Questions section of the Yodio website at [email or web address] Remember, Yodio is NOT to be used for urgent needs. For medical emergencies, dial 911. Now available from your iPhone and Android! Please provide this summary of care documentation to your next provider. Your primary care clinician is listed as [address]. If you have any questions after today's visit, please call [phone].

## 2017-10-25 NOTE — PATIENT INSTRUCTIONS
Maintain appointment with Dr. Val Byrne. Start Topamax as we described with goal of 100 mg nightly. Take Imitrex as needed. Take as indicated. I have placed referral for psychiatry. Follow up in 3 months.

## 2017-10-25 NOTE — PROGRESS NOTES
[address], [address], [place], [address]  [facility] Poli Ocampo, [address]  Office:  [phone]  Fax: [phone]  Chief Complaint   Patient presents with    Neurologic Problem     f/u headaches. Patient states that she was hospitalized for asthma attack and has been unable to take migraine meds. This is a 27year old female that presents for follow up. MRI of the brain showing possible low grad glioma. Patient has appointment scheduled with Dr. Diamond Chowdary for November. Mentions she was aware of this lesion since around age 10. Previous history of seizures as a child. Denies recent seizures. Denies occult seizure symptoms. Headache are somewhat better. Becoming less frequent but mentions worsening headache intensity. Denies daily headaches. Having 4 migraine days a month. She started Topamax for two days but stopped because she has been dealing with issue with her asthma. She was on steroids for this and was instructed not to take the Topamax with the prednisone. She has since finished the prednisone taper. She had several ED admissions in the interim due to asthma attacks. Denies seeing a pulmonologist. She mentions some concerns for her mood. Says she feels down today and wonders if it was related to the steroids or what has been going on with her in general. Denies having spoken to a therapist. In the past she had a referral to psych by her PCP but was unable to schedule this appointment. Denies wanting to take antidepressants. Past Medical History:   Diagnosis Date    Asthma     Neurological disorder        Past Surgical History:   Procedure Laterality Date    HX HEENT      tonsilectomy    HX OTHER SURGICAL      HX WISDOM TEETH EXTRACTION  2002       Current Outpatient Prescriptions   Medication Sig Dispense Refill    fluticasone-salmeterol (ADVAIR DISKUS) 250-50 mcg/dose diskus inhaler Take 1 Puff by inhalation every twelve (12) hours.       loratadine (CLARITIN) 10 mg tablet Take 10 mg by mouth.  albuterol (PROVENTIL VENTOLIN) 2.5 mg /3 mL (0.083 %) nebulizer solution 3 mL by Nebulization route every four (4) hours as needed for Wheezing. 24 Each 0    OTHER Birth control pills---Lo lo estrin      topiramate (TOPAMAX) 25 mg tablet Take 25 mg qhs x 7 days, then take 50 mg qhs x 7 days, then take 75 mg qhs x 7 days, then take 100 mg qhs thereafter. 70 Tab 0    topiramate (TOPAMAX) 100 mg tablet Take 1 Tab by mouth nightly. 30 Tab 3    SUMAtriptan (IMITREX) 100 mg tablet Take one tablet at HA onset may repeat > 2 hours if needed. Max 2 tabs in 24 hours 9 Tab 2    prenatal vit-calcium-iron-fa (PRENATAL PLUS, CALCIUM CARB,) 27 mg iron- 1 mg tab Take 1 Tab by mouth daily. 100 Tab 10        Allergies   Allergen Reactions    Latex Contact Dermatitis    Dilantin [Phenytoin Sodium Extended] Anaphylaxis    Phenobarbital Anaphylaxis       Social History   Substance Use Topics    Smoking status: Never Smoker    Smokeless tobacco: Never Used    Alcohol use No       Family History   Problem Relation Age of Onset    Diabetes Mother     Diabetes Maternal Grandmother     Cancer Maternal Grandmother     Stroke Paternal Grandmother     Diabetes Maternal Aunt      Review of Systems  Pertinent positives and negatives as noted otherwise comprehensive review is negative. Examination  Visit Vitals    BP 90/60    Pulse 76    Temp 97.5 °F (36.4 °C) (Temporal)    Resp 16    Ht 5' 3\" (1.6 m)    Wt 69.6 kg (153 lb 6.4 oz)    LMP 10/05/2017    SpO2 99%    BMI 27.17 kg/m2     This is a very pleasant 27year old female, well appearing. No icterus. Oropharynx clear. Supple neck without bruit. Heart regular. No murmur. No edema. Neurologically, she is awake, alert, and oriented with normal speech and language. Her cognition is normal.  She is able to discuss her medical history. She is able to discuss her medications.   She is able to discuss current events. Intact cranial nerves 2-12. No nystagmus. She has normal bulk and tone. She has no abnormal movement. She has no pronation or drift. She generates full strength in the upper and lower extremities. Reflexes are symmetrical in the upper and lower extremities bilaterally. Finger nose finger and rapid alternating movements are normal.  Steady gait. Impression/Plan  Mary Gomez is a 27 y.o. female whose history and physical are consistent with migraine headache and suspected glioma on recent MRI of the brain. I have reviewed MRI of the brain from 9/22/17 that is scanned in . Report findings: 1.3 x 1.1 x 1.3 cm anterior right insular cortical based mass that is compatible with low grade glioma, ganglioglioma, or DNET. No vasogenic edema or midline shift. Subtle local mass effect. Patient aware of history of mass at age 10. Patient has appointment with Dr. Hailey Crouch in November for evaluation. She will bring disk and maintain this appointment. Patient mentions improvement in headache, not having daily headaches, but with worsening intensity. She has not started Topamax for this. Interim visits due to asthma exacerbations and was on taper of prednisone. She has completed steroids. Consider evaluation by pulmonology for uncontrolled asthma. Will restart Topamax as we discussed at previous visit. Imitrex as needed. Continue to avoid OTC medications. Referral placed to psychiatry for evaluation of depression. No SI/HI. Follow up in 3 months. Patient agreeable with plan of care. Diagnoses and all orders for this visit:    1. Depression, unspecified depression type  -     REFERRAL TO PSYCHIATRY    2. History of seizures as a child  -     REFERRAL TO PSYCHIATRY    3.  Intractable migraine without aura and with status migrainosus  -     REFERRAL TO PSYCHIATRY    4. Glioma of frontal lobe (HCC)    Total time: 15 min   Counseling / coordination time: 12 min   > 50% counseling / coordination?: Yes re: symptoms, results, management, plan of care, and answering questions    Maximo Scales, NP  This note will not be viewable in [address]

## 2017-10-27 ENCOUNTER — OFFICE VISIT (OUTPATIENT)
Dept: SURGERY | Age: 32
End: 2017-10-27

## 2017-10-27 VITALS — SYSTOLIC BLOOD PRESSURE: 110 MMHG | RESPIRATION RATE: 16 BRPM | DIASTOLIC BLOOD PRESSURE: 74 MMHG

## 2017-10-27 DIAGNOSIS — K82.8 BILIARY DYSKINESIA: Primary | ICD-10-CM

## 2017-10-27 NOTE — PROGRESS NOTES
Progress Note    Patient: Leanne Lewis  MRN: [id number]  SSN: [id number]   YOB: 1985  Age: 27 y.o. Sex: female     Chief Complaint   Patient presents with    Abdominal Pain     hida scan       HPI    Ms Iveth Shanks is a 42-year-old woman who presents with 7 months of right upper quadrant pain and some belching and reflux type symptoms. She has had a workup to include an ultrasound that shows no stones but a HIDA scan that shows a 5% ejection fraction and caused her pain during CCK stimulation. 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 She has not had a seizure since she was 4years old. She has been followed by a neurologist and is recently been sent to a neurosurgeon. I have discussed with her PCP the situation and her candidacy for surgery. She looks like a normal healthy woman and I have been reassured that she is done very well medically. Past Medical History:   Diagnosis Date    Asthma     Neurological disorder      Past Surgical History:   Procedure Laterality Date    HX HEENT      tonsilectomy    HX OTHER SURGICAL      HX WISDOM TEETH EXTRACTION  2002     Allergies   Allergen Reactions    Latex Contact Dermatitis    Dilantin [Phenytoin Sodium Extended] Anaphylaxis    Phenobarbital Anaphylaxis     Current Outpatient Prescriptions   Medication Sig Dispense Refill    fluticasone-salmeterol (ADVAIR DISKUS) 250-50 mcg/dose diskus inhaler Take 1 Puff by inhalation every twelve (12) hours.  loratadine (CLARITIN) 10 mg tablet Take 10 mg by mouth.  albuterol (PROVENTIL VENTOLIN) 2.5 mg /3 mL (0.083 %) nebulizer solution 3 mL by Nebulization route every four (4) hours as needed for Wheezing.  24 Each 0    OTHER Birth control pills---Lo lo estrin      topiramate (TOPAMAX) 25 mg tablet Take 25 mg qhs x 7 days, then take 50 mg qhs x 7 days, then take 75 mg qhs x 7 days, then take 100 mg qhs thereafter. 70 Tab 0    prenatal vit-calcium-iron-fa (PRENATAL PLUS, CALCIUM CARB,) 27 mg iron- 1 mg tab Take 1 Tab by mouth daily. 100 Tab 10    topiramate (TOPAMAX) 100 mg tablet Take 1 Tab by mouth nightly. 30 Tab 3    SUMAtriptan (IMITREX) 100 mg tablet Take one tablet at HA onset may repeat > 2 hours if needed. Max 2 tabs in 24 hours 9 Tab 2     Social History     Social History    Marital status: SINGLE     Spouse name: N/A    Number of children: N/A    Years of education: N/A     Occupational History    Not on file. Social History Main Topics    Smoking status: Never Smoker    Smokeless tobacco: Never Used    Alcohol use No    Drug use: No    Sexual activity: Not on file     Other Topics Concern    Not on file     Social History Narrative     Family History   Problem Relation Age of Onset    Diabetes Mother     Diabetes Maternal Grandmother     Cancer Maternal Grandmother     Stroke Paternal Grandmother     Diabetes Maternal Aunt          Review of systems:  Patient denies any reflux, emesis, abdominal pain, change in bowel habits, hematochezia, melena, fever, weight loss, fatigue chills, dermatitis, abnormal moles, change in vision, vertigo, epistaxis, dysphagia, hoarseness, chest pain, palpitations, hypertension, edema, cough, shortness of breath, wheezing, hemoptysis, snoring, hematuria, diabetes, thyroid disease, anemia, bruising, history of blood transfusion, dizziness, headache, or fainting.     Physical Examination    Well developed well nourished female in no apparent distress  Visit Vitals    /74    Resp 16    LMP 10/05/2017      Head: normocephalic, atraumatic  Mouth: Clear, no overt lesions, oral mucosa pink and moist  Neck: supple, no masses, no adenopathy or carotid bruits, trachea midline  Resp: clear to auscultation bilaterally, no wheeze, rhonchi or rales, excursions normal and symmetrical  Cardio: Regular rate and rhythm, no murmurs, clicks, gallops or rubs, no edema or varicosities  Abdomen: soft, nontender, nondistended, normoactive bowel sounds, no hernias, no hepatosplenomegaly,   Back: Deferred  Extremeties: warm, well-perfused, no tenderness or swelling, normal gait/station  Neuro: sensation and strength grossly intact and symmetrical  Psych: alert and oriented to person, place and time  Breast exam deferred    IMPRESSION  Biliary dyskinesia    PLAN  No orders of the defined types were placed in this encounter.     Laparoscopic cholecystectomy  Eugenia Solorio MD

## 2017-10-27 NOTE — MR AVS SNAPSHOT
Visit Information Date & Time Provider Department Dept. Phone Encounter #  
 10/27/2017  2:00 PM Ranjana Jean MD [facility] [phone] [id number] Your Appointments 1/25/2018  9:15 AM  
Follow Up with Ramone Shah NP [facility] [facility] ([facility]) Appt Note: 3 mo f/u  
 [address] [facility] [id number] [phone]  
  
   
 [facility] [address] Upcoming Health Maintenance Date Due Pneumococcal 19-64 Medium Risk (1 of 1 - PPSV23) 11/7/2003 INFLUENZA AGE 9 TO ADULT 8/1/2017 PAP AKA CERVICAL CYTOLOGY 11/10/2017 DTaP/Tdap/Td series (2 - Td) 9/27/2024 Allergies as of 10/27/2017  Review Complete On: 10/27/2017 By: Ranjana Jean MD  
  
 Severity Noted Reaction Type Reactions Latex  09/25/2014    Contact Dermatitis Dilantin [Phenytoin Sodium Extended] High 07/03/2013   Systemic Anaphylaxis Phenobarbital High 07/03/2013   Systemic Anaphylaxis Current Immunizations  Never Reviewed Name Date Tdap 9/27/2014 12:04 PM  
  
 Not reviewed this visit Vitals BP Resp LMP OB Status Smoking Status 110/74 16 10/05/2017 Unknown Never Smoker Vitals History Preferred Pharmacy Pharmacy Name Phone [facility], [address] [place] [address] [phone] Your Updated Medication List  
  
   
This list is accurate as of: 10/27/17  2:34 PM.  Always use your most recent med list.  
  
  
  
  
 Shen Jeff 250-50 mcg/dose diskus inhaler Generic drug:  fluticasone-salmeterol Take 1 Puff by inhalation every twelve (12) hours. albuterol 2.5 mg /3 mL (0.083 %) nebulizer solution Commonly known as:  PROVENTIL VENTOLIN  
3 mL by Nebulization route every four (4) hours as needed for Wheezing. CLARITIN 10 mg tablet Generic drug:  loratadine Take 10 mg by mouth.   
  
 OTHER  
 Birth control pills---Lo lo estrin  
  
 prenatal vit-calcium-iron-fa 27 mg iron- 1 mg Tab Commonly known as:  PRENATAL PLUS (CALCIUM CARB) Take 1 Tab by mouth daily. SUMAtriptan 100 mg tablet Commonly known as:  IMITREX Take one tablet at HA onset may repeat > 2 hours if needed. Max 2 tabs in 24 hours * topiramate 25 mg tablet Commonly known as:  TOPAMAX Take 25 mg qhs x 7 days, then take 50 mg qhs x 7 days, then take 75 mg qhs x 7 days, then take 100 mg qhs thereafter. * topiramate 100 mg tablet Commonly known as:  TOPAMAX Take 1 Tab by mouth nightly. * Notice: This list has 2 medication(s) that are the same as other medications prescribed for you. Read the directions carefully, and ask your doctor or other care provider to review them with you. Introducing [facility] SERVICES! Dear Linda Boudreaux: Thank you for requesting a feedPack account. Our records indicate that you already have an active feedPack account. You can access your account anytime at [email or web address] Did you know that you can access your hospital and ER discharge instructions at any time in feedPack? You can also review all of your test results from your hospital stay or ER visit. Additional Information If you have questions, please visit the Frequently Asked Questions section of the feedPack website at [email or web address] Remember, feedPack is NOT to be used for urgent needs. For medical emergencies, dial 911. Now available from your iPhone and Android! Please provide this summary of care documentation to your next provider. Your primary care clinician is listed as [address]. If you have any questions after today's visit, please call [phone].

## 2017-11-01 DIAGNOSIS — K82.8 BILIARY DYSKINESIA: Primary | ICD-10-CM

## 2017-11-02 ENCOUNTER — HOSPITAL ENCOUNTER (OUTPATIENT)
Dept: PREADMISSION TESTING | Age: 32
Discharge: HOME | End: 2017-11-02
Payer: COMMERCIAL

## 2017-11-02 DIAGNOSIS — K82.8 BILIARY DYSKINESIA: ICD-10-CM

## 2017-11-02 LAB
ANION GAP SERPL CALC-SCNC: 9 MMOL/L (ref 3–18)
BASOPHILS # BLD: 0 K/UL (ref 0–0.06)
BASOPHILS NFR BLD: 0 % (ref 0–2)
BUN SERPL-MCNC: 13 MG/DL (ref 7–18)
BUN/CREAT SERPL: 17 (ref 12–20)
CALCIUM SERPL-MCNC: 9.3 MG/DL (ref 8.5–10.1)
CHLORIDE SERPL-SCNC: 107 MMOL/L (ref 100–108)
CO2 SERPL-SCNC: 24 MMOL/L (ref 21–32)
CREAT SERPL-MCNC: 0.78 MG/DL (ref 0.6–1.3)
DIFFERENTIAL METHOD BLD: NORMAL
EOSINOPHIL # BLD: 0.1 K/UL (ref 0–0.4)
EOSINOPHIL NFR BLD: 1 % (ref 0–5)
ERYTHROCYTE [DISTWIDTH] IN BLOOD BY AUTOMATED COUNT: 13.3 % (ref 11.6–14.5)
GLUCOSE SERPL-MCNC: 91 MG/DL (ref 74–99)
HCG SERPL QL: NEGATIVE
HCT VFR BLD AUTO: 41.5 % (ref 35–45)
HGB BLD-MCNC: 13.6 G/DL (ref 12–16)
LYMPHOCYTES # BLD: 1.8 K/UL (ref 0.9–3.6)
LYMPHOCYTES NFR BLD: 22 % (ref 21–52)
MCH RBC QN AUTO: 31.3 PG (ref 24–34)
MCHC RBC AUTO-ENTMCNC: 32.8 G/DL (ref 31–37)
MCV RBC AUTO: 95.4 FL (ref 74–97)
MONOCYTES # BLD: 0.6 K/UL (ref 0.05–1.2)
MONOCYTES NFR BLD: 8 % (ref 3–10)
NEUTS SEG # BLD: 5.8 K/UL (ref 1.8–8)
NEUTS SEG NFR BLD: 69 % (ref 40–73)
PLATELET # BLD AUTO: 301 K/UL (ref 135–420)
PMV BLD AUTO: 10.5 FL (ref 9.2–11.8)
POTASSIUM SERPL-SCNC: 5.2 MMOL/L (ref 3.5–5.5)
RBC # BLD AUTO: 4.35 M/UL (ref 4.2–5.3)
SODIUM SERPL-SCNC: 140 MMOL/L (ref 136–145)
WBC # BLD AUTO: 8.4 K/UL (ref 4.6–13.2)

## 2017-11-02 PROCEDURE — 36415 COLL VENOUS BLD VENIPUNCTURE: CPT

## 2017-11-02 PROCEDURE — 84703 CHORIONIC GONADOTROPIN ASSAY: CPT

## 2017-11-02 PROCEDURE — 85025 COMPLETE CBC W/AUTO DIFF WBC: CPT

## 2017-11-02 PROCEDURE — 80048 BASIC METABOLIC PNL TOTAL CA: CPT

## 2017-11-02 PROCEDURE — 93005 ELECTROCARDIOGRAM TRACING: CPT

## 2017-11-02 RX ORDER — TOPIRAMATE 50 MG/1
100 TABLET, FILM COATED ORAL DAILY
COMMUNITY
End: 2017-12-20 | Stop reason: SDUPTHER

## 2017-11-03 LAB
ATRIAL RATE: 66 BPM
CALCULATED P AXIS, ECG09: 51 DEGREES
CALCULATED R AXIS, ECG10: 76 DEGREES
CALCULATED T AXIS, ECG11: 64 DEGREES
DIAGNOSIS, 93000: NORMAL
P-R INTERVAL, ECG05: 146 MS
Q-T INTERVAL, ECG07: 402 MS
QRS DURATION, ECG06: 72 MS
QTC CALCULATION (BEZET), ECG08: 421 MS
VENTRICULAR RATE, ECG03: 66 BPM

## 2017-11-06 ENCOUNTER — ANESTHESIA EVENT (OUTPATIENT)
Dept: SURGERY | Age: 32
End: 2017-11-06
Payer: COMMERCIAL

## 2017-11-07 ENCOUNTER — HOSPITAL ENCOUNTER (OUTPATIENT)
Age: 32
Setting detail: OUTPATIENT SURGERY
Discharge: HOME | End: 2017-11-07
Payer: COMMERCIAL

## 2017-11-07 ENCOUNTER — ANESTHESIA (OUTPATIENT)
Dept: SURGERY | Age: 32
End: 2017-11-07
Payer: COMMERCIAL

## 2017-11-07 VITALS
BODY MASS INDEX: 27.36 KG/M2 | WEIGHT: 154.4 LBS | HEIGHT: 63 IN | OXYGEN SATURATION: 100 % | SYSTOLIC BLOOD PRESSURE: 101 MMHG | HEART RATE: 80 BPM | TEMPERATURE: 97 F | DIASTOLIC BLOOD PRESSURE: 66 MMHG | RESPIRATION RATE: 16 BRPM

## 2017-11-07 DIAGNOSIS — K82.8 BILIARY DYSKINESIA: Primary | ICD-10-CM

## 2017-11-07 LAB — HCG UR QL: NEGATIVE

## 2017-11-07 PROCEDURE — 77030026438 HC STYL ET INTUB CARD -A: Performed by: NURSE ANESTHETIST, CERTIFIED REGISTERED

## 2017-11-07 PROCEDURE — 77030020782 HC GWN BAIR PAWS FLX 3M -B

## 2017-11-07 PROCEDURE — 77030012012 HC AIRWY OP SFT TELE -A: Performed by: NURSE ANESTHETIST, CERTIFIED REGISTERED

## 2017-11-07 PROCEDURE — 77030010351 HC TRCR ENDOSC VSTP COVD -B

## 2017-11-07 PROCEDURE — 74011250636 HC RX REV CODE- 250/636

## 2017-11-07 PROCEDURE — 88304 TISSUE EXAM BY PATHOLOGIST: CPT

## 2017-11-07 PROCEDURE — 76060000033 HC ANESTHESIA 1 TO 1.5 HR

## 2017-11-07 PROCEDURE — 76010000149 HC OR TIME 1 TO 1.5 HR

## 2017-11-07 PROCEDURE — 77030008771 HC TU NG SALEM SUMP -A: Performed by: NURSE ANESTHETIST, CERTIFIED REGISTERED

## 2017-11-07 PROCEDURE — 77030027491 HC SHR ENDOSC COVD -B

## 2017-11-07 PROCEDURE — 76210000028 HC REC RM PH II 4 TO 4.5 HR

## 2017-11-07 PROCEDURE — 77030011640 HC PAD GRND REM COVD -A

## 2017-11-07 PROCEDURE — 77030019908 HC STETH ESOPH SIMS -A: Performed by: NURSE ANESTHETIST, CERTIFIED REGISTERED

## 2017-11-07 PROCEDURE — 74011000250 HC RX REV CODE- 250

## 2017-11-07 PROCEDURE — 77030037051 HC TRCR ENDOSC VRSPRT BLD COVD -B

## 2017-11-07 PROCEDURE — 77030002933 HC SUT MCRYL J&J -A

## 2017-11-07 PROCEDURE — 77030032490 HC SLV COMPR SCD KNE COVD -B

## 2017-11-07 PROCEDURE — 81025 URINE PREGNANCY TEST: CPT

## 2017-11-07 PROCEDURE — 77030009852 HC PCH RTVR ENDOSC COVD -B

## 2017-11-07 PROCEDURE — 76210000006 HC OR PH I REC 0.5 TO 1 HR

## 2017-11-07 PROCEDURE — 77030008683 HC TU ET CUF COVD -A: Performed by: NURSE ANESTHETIST, CERTIFIED REGISTERED

## 2017-11-07 PROCEDURE — 77030020255 HC SOL INJ LR 1000ML BG

## 2017-11-07 PROCEDURE — 77030035220 HC TRCR ENDOSC BLNTPRT ANCHR COVD -B

## 2017-11-07 PROCEDURE — 74011250637 HC RX REV CODE- 250/637: Performed by: ANESTHESIOLOGY

## 2017-11-07 PROCEDURE — 74011250636 HC RX REV CODE- 250/636: Performed by: ANESTHESIOLOGY

## 2017-11-07 PROCEDURE — 77030010030

## 2017-11-07 PROCEDURE — 77030018836 HC SOL IRR NACL ICUM -A

## 2017-11-07 PROCEDURE — 77030031139 HC SUT VCRL2 J&J -A

## 2017-11-07 RX ORDER — LIDOCAINE HYDROCHLORIDE 10 MG/ML
0.1 INJECTION, SOLUTION EPIDURAL; INFILTRATION; INTRACAUDAL; PERINEURAL AS NEEDED
Status: DISCONTINUED | OUTPATIENT
Start: 2017-11-07 | End: 2017-11-07 | Stop reason: HOSPADM

## 2017-11-07 RX ORDER — LIDOCAINE HYDROCHLORIDE 20 MG/ML
INJECTION, SOLUTION EPIDURAL; INFILTRATION; INTRACAUDAL; PERINEURAL AS NEEDED
Status: DISCONTINUED | OUTPATIENT
Start: 2017-11-07 | End: 2017-11-07 | Stop reason: HOSPADM

## 2017-11-07 RX ORDER — HYDROMORPHONE HYDROCHLORIDE 2 MG/ML
0.5 INJECTION, SOLUTION INTRAMUSCULAR; INTRAVENOUS; SUBCUTANEOUS
Status: DISCONTINUED | OUTPATIENT
Start: 2017-11-07 | End: 2017-11-07 | Stop reason: HOSPADM

## 2017-11-07 RX ORDER — SODIUM CHLORIDE, SODIUM LACTATE, POTASSIUM CHLORIDE, CALCIUM CHLORIDE 600; 310; 30; 20 MG/100ML; MG/100ML; MG/100ML; MG/100ML
75 INJECTION, SOLUTION INTRAVENOUS CONTINUOUS
Status: DISCONTINUED | OUTPATIENT
Start: 2017-11-07 | End: 2017-11-07 | Stop reason: HOSPADM

## 2017-11-07 RX ORDER — FENTANYL CITRATE 50 UG/ML
INJECTION, SOLUTION INTRAMUSCULAR; INTRAVENOUS AS NEEDED
Status: DISCONTINUED | OUTPATIENT
Start: 2017-11-07 | End: 2017-11-07 | Stop reason: HOSPADM

## 2017-11-07 RX ORDER — EPHEDRINE SULFATE/0.9% NACL/PF 25 MG/5 ML
SYRINGE (ML) INTRAVENOUS AS NEEDED
Status: DISCONTINUED | OUTPATIENT
Start: 2017-11-07 | End: 2017-11-07 | Stop reason: HOSPADM

## 2017-11-07 RX ORDER — DEXAMETHASONE SODIUM PHOSPHATE 4 MG/ML
INJECTION, SOLUTION INTRA-ARTICULAR; INTRALESIONAL; INTRAMUSCULAR; INTRAVENOUS; SOFT TISSUE AS NEEDED
Status: DISCONTINUED | OUTPATIENT
Start: 2017-11-07 | End: 2017-11-07 | Stop reason: HOSPADM

## 2017-11-07 RX ORDER — MIDAZOLAM HYDROCHLORIDE 1 MG/ML
INJECTION, SOLUTION INTRAMUSCULAR; INTRAVENOUS AS NEEDED
Status: DISCONTINUED | OUTPATIENT
Start: 2017-11-07 | End: 2017-11-07 | Stop reason: HOSPADM

## 2017-11-07 RX ORDER — ONDANSETRON 2 MG/ML
INJECTION INTRAMUSCULAR; INTRAVENOUS AS NEEDED
Status: DISCONTINUED | OUTPATIENT
Start: 2017-11-07 | End: 2017-11-07 | Stop reason: HOSPADM

## 2017-11-07 RX ORDER — PROPOFOL 10 MG/ML
INJECTION, EMULSION INTRAVENOUS AS NEEDED
Status: DISCONTINUED | OUTPATIENT
Start: 2017-11-07 | End: 2017-11-07 | Stop reason: HOSPADM

## 2017-11-07 RX ORDER — BUPIVACAINE HYDROCHLORIDE AND EPINEPHRINE 2.5; 5 MG/ML; UG/ML
INJECTION, SOLUTION EPIDURAL; INFILTRATION; INTRACAUDAL; PERINEURAL AS NEEDED
Status: DISCONTINUED | OUTPATIENT
Start: 2017-11-07 | End: 2017-11-07 | Stop reason: HOSPADM

## 2017-11-07 RX ORDER — OXYCODONE AND ACETAMINOPHEN 5; 325 MG/1; MG/1
1 TABLET ORAL
Status: DISCONTINUED | OUTPATIENT
Start: 2017-11-07 | End: 2017-11-07 | Stop reason: HOSPADM

## 2017-11-07 RX ORDER — SUCCINYLCHOLINE CHLORIDE 20 MG/ML
INJECTION INTRAMUSCULAR; INTRAVENOUS AS NEEDED
Status: DISCONTINUED | OUTPATIENT
Start: 2017-11-07 | End: 2017-11-07 | Stop reason: HOSPADM

## 2017-11-07 RX ORDER — CEFAZOLIN SODIUM 2 G/50ML
2 SOLUTION INTRAVENOUS ONCE
Status: COMPLETED | OUTPATIENT
Start: 2017-11-07 | End: 2017-11-07

## 2017-11-07 RX ORDER — ROCURONIUM BROMIDE 10 MG/ML
INJECTION, SOLUTION INTRAVENOUS AS NEEDED
Status: DISCONTINUED | OUTPATIENT
Start: 2017-11-07 | End: 2017-11-07 | Stop reason: HOSPADM

## 2017-11-07 RX ORDER — HYDROMORPHONE HYDROCHLORIDE 2 MG/ML
INJECTION, SOLUTION INTRAMUSCULAR; INTRAVENOUS; SUBCUTANEOUS
Status: COMPLETED
Start: 2017-11-07 | End: 2017-11-07

## 2017-11-07 RX ORDER — SODIUM CHLORIDE 0.9 % (FLUSH) 0.9 %
5-10 SYRINGE (ML) INJECTION AS NEEDED
Status: DISCONTINUED | OUTPATIENT
Start: 2017-11-07 | End: 2017-11-07 | Stop reason: HOSPADM

## 2017-11-07 RX ORDER — GLYCOPYRROLATE 0.2 MG/ML
INJECTION INTRAMUSCULAR; INTRAVENOUS AS NEEDED
Status: DISCONTINUED | OUTPATIENT
Start: 2017-11-07 | End: 2017-11-07 | Stop reason: HOSPADM

## 2017-11-07 RX ORDER — SODIUM CHLORIDE 0.9 % (FLUSH) 0.9 %
5-10 SYRINGE (ML) INJECTION EVERY 8 HOURS
Status: DISCONTINUED | OUTPATIENT
Start: 2017-11-07 | End: 2017-11-07 | Stop reason: HOSPADM

## 2017-11-07 RX ORDER — OXYCODONE AND ACETAMINOPHEN 5; 325 MG/1; MG/1
1 TABLET ORAL
Qty: 30 TAB | Refills: 0 | Status: SHIPPED | OUTPATIENT
Start: 2017-11-07 | End: 2018-01-25

## 2017-11-07 RX ORDER — NEOSTIGMINE METHYLSULFATE 5 MG/5 ML
SYRINGE (ML) INTRAVENOUS AS NEEDED
Status: DISCONTINUED | OUTPATIENT
Start: 2017-11-07 | End: 2017-11-07 | Stop reason: HOSPADM

## 2017-11-07 RX ORDER — FAMOTIDINE 20 MG/1
20 TABLET, FILM COATED ORAL ONCE
Status: COMPLETED | OUTPATIENT
Start: 2017-11-07 | End: 2017-11-07

## 2017-11-07 RX ADMIN — SUCCINYLCHOLINE CHLORIDE 100 MG: 20 INJECTION INTRAMUSCULAR; INTRAVENOUS at 07:37

## 2017-11-07 RX ADMIN — SODIUM CHLORIDE, SODIUM LACTATE, POTASSIUM CHLORIDE, AND CALCIUM CHLORIDE: 600; 310; 30; 20 INJECTION, SOLUTION INTRAVENOUS at 08:40

## 2017-11-07 RX ADMIN — GLYCOPYRROLATE 0.6 MG: 0.2 INJECTION INTRAMUSCULAR; INTRAVENOUS at 08:25

## 2017-11-07 RX ADMIN — FENTANYL CITRATE 50 MCG: 50 INJECTION, SOLUTION INTRAMUSCULAR; INTRAVENOUS at 08:03

## 2017-11-07 RX ADMIN — SODIUM CHLORIDE, SODIUM LACTATE, POTASSIUM CHLORIDE, AND CALCIUM CHLORIDE 75 ML/HR: 600; 310; 30; 20 INJECTION, SOLUTION INTRAVENOUS at 06:53

## 2017-11-07 RX ADMIN — Medication 5 MG: at 07:59

## 2017-11-07 RX ADMIN — FENTANYL CITRATE 50 MCG: 50 INJECTION, SOLUTION INTRAMUSCULAR; INTRAVENOUS at 08:29

## 2017-11-07 RX ADMIN — ONDANSETRON 4 MG: 2 INJECTION INTRAMUSCULAR; INTRAVENOUS at 08:20

## 2017-11-07 RX ADMIN — MIDAZOLAM HYDROCHLORIDE 2 MG: 1 INJECTION, SOLUTION INTRAMUSCULAR; INTRAVENOUS at 07:28

## 2017-11-07 RX ADMIN — PROPOFOL 170 MG: 10 INJECTION, EMULSION INTRAVENOUS at 07:37

## 2017-11-07 RX ADMIN — HYDROMORPHONE HYDROCHLORIDE 0.5 MG: 2 INJECTION, SOLUTION INTRAMUSCULAR; INTRAVENOUS; SUBCUTANEOUS at 09:01

## 2017-11-07 RX ADMIN — CEFAZOLIN SODIUM 2 G: 2 SOLUTION INTRAVENOUS at 07:28

## 2017-11-07 RX ADMIN — DEXAMETHASONE SODIUM PHOSPHATE 4 MG: 4 INJECTION, SOLUTION INTRA-ARTICULAR; INTRALESIONAL; INTRAMUSCULAR; INTRAVENOUS; SOFT TISSUE at 07:45

## 2017-11-07 RX ADMIN — Medication 3 MG: at 08:25

## 2017-11-07 RX ADMIN — FENTANYL CITRATE 50 MCG: 50 INJECTION, SOLUTION INTRAMUSCULAR; INTRAVENOUS at 08:42

## 2017-11-07 RX ADMIN — SODIUM CHLORIDE, SODIUM LACTATE, POTASSIUM CHLORIDE, AND CALCIUM CHLORIDE 75 ML/HR: 600; 310; 30; 20 INJECTION, SOLUTION INTRAVENOUS at 10:16

## 2017-11-07 RX ADMIN — FENTANYL CITRATE 50 MCG: 50 INJECTION, SOLUTION INTRAMUSCULAR; INTRAVENOUS at 08:09

## 2017-11-07 RX ADMIN — GLYCOPYRROLATE 0.2 MG: 0.2 INJECTION INTRAMUSCULAR; INTRAVENOUS at 07:28

## 2017-11-07 RX ADMIN — LIDOCAINE HYDROCHLORIDE 60 MG: 20 INJECTION, SOLUTION EPIDURAL; INFILTRATION; INTRACAUDAL; PERINEURAL at 07:37

## 2017-11-07 RX ADMIN — ROCURONIUM BROMIDE 20 MG: 10 INJECTION, SOLUTION INTRAVENOUS at 07:48

## 2017-11-07 RX ADMIN — FAMOTIDINE 20 MG: 20 TABLET, FILM COATED ORAL at 06:53

## 2017-11-07 RX ADMIN — ROCURONIUM BROMIDE 5 MG: 10 INJECTION, SOLUTION INTRAVENOUS at 07:37

## 2017-11-07 RX ADMIN — FENTANYL CITRATE 50 MCG: 50 INJECTION, SOLUTION INTRAMUSCULAR; INTRAVENOUS at 07:37

## 2017-11-07 RX ADMIN — HYDROMORPHONE HYDROCHLORIDE 0.5 MG: 2 INJECTION, SOLUTION INTRAMUSCULAR; INTRAVENOUS; SUBCUTANEOUS at 09:11

## 2017-11-07 RX ADMIN — Medication 5 MG: at 08:03

## 2017-11-07 NOTE — BRIEF OP NOTE
BRIEF OPERATIVE NOTE    Date of Procedure: 11/7/2017   Preoperative Diagnosis: Biliary dyskinesia [K82.8]  Postoperative Diagnosis: Biliary dyskinesia [K82.8]    Procedure(s):  LAPAROSCOPIC CHOLECYSTECTOMY   Surgeon(s) and Role:     * Jensen Alexis MD - Primary         Assistant Staff:       Surgical Staff:  Circ-1: Pham Jordan RN  Scrub Tech-1: Pradeep Meadows  Surg Asst-1: Cyndi Nasim  Event Time In   Incision Start 2583   Incision Close      Anesthesia: General   Estimated Blood Loss: 0  Specimens:   ID Type Source Tests Collected by Time Destination   1 : gallbladder and contents Preservative Gallbladder  Jensen Alexis MD 11/7/2017 0801 Pathology      Findings: 0   Complications: 0  Implants: * No implants in log *

## 2017-11-07 NOTE — IP AVS SNAPSHOT
Lauren Roberson 
 
 
 [address] [facility] [address] [facility] Patient: Luis Daniel Yuen MRN: [id number] :1985 About your hospitalization You were admitted on:  2017 You last received care in the:  [facility] PHASE 2 RECOVERY You were discharged on:  2017 Why you were hospitalized Your primary diagnosis was:  Not on File Things You Need To Do (next 8 weeks) Follow up with Rachid Jimenes MD  
  
Phone:  [phone] Where:  [address], [address] [address] Follow up with Eugenia Solorio MD  
Follow up as scheduled. Phone:  [phone] Where:  [address], [address], [address] [facility] [address], [address]  POST OP with Fadi Parnell NP at  9:30 AM  
Where: [address] ([place]) Discharge Orders Procedure Order Date Status Priority Quantity Spec Type Associated Dx DIET REGULAR 17 0835 Normal Routine 1  Biliary dyskinesia [51100] NURSING-MISCELLANEOUS: discharge after voiding 17 0835 Normal Routine 1  Biliary dyskinesia [53131] Questions: Description of Order:  discharge after voiding A check luis indicates which time of day the medication should be taken. My Medications TAKE these medications as instructed Instructions Each Dose to Equal  
 Morning Noon Evening Bedtime ADVAIR DISKUS 250-50 mcg/dose diskus inhaler Generic drug:  fluticasone-salmeterol Your last dose was: Your next dose is: Take 1 Puff by inhalation daily. 1 Puff  
    
   
   
   
  
 albuterol 2.5 mg /3 mL (0.083 %) nebulizer solution Commonly known as:  PROVENTIL VENTOLIN Your last dose was: Your next dose is:    
   
   
 3 mL by Nebulization route every four (4) hours as needed for Wheezing.   
 2.5 mg  
    
   
   
   
  
 CLARITIN 10 mg tablet Generic drug:  loratadine Your last dose was: Your next dose is: Take 10 mg by mouth. 10 mg  
    
   
   
   
  
 OTHER Your last dose was: Your next dose is:    
   
   
 Birth control pills---Lo lo estrin  
     
   
   
   
  
 oxyCODONE-acetaminophen 5-325 mg per tablet Commonly known as:  PERCOCET Your last dose was: Your next dose is: Take 1 Tab by mouth every six (6) hours as needed for Pain. Max Daily Amount: 4 Tabs. 1 Tab  
    
   
   
   
  
 prenatal vit-calcium-iron-fa 27 mg iron- 1 mg Tab Commonly known as:  PRENATAL PLUS (CALCIUM CARB) Your last dose was: Your next dose is: Take 1 Tab by mouth daily. 1 Tab  
    
   
   
   
  
 SUMAtriptan 100 mg tablet Commonly known as:  IMITREX Your last dose was: Your next dose is: Take one tablet at HA onset may repeat > 2 hours if needed. Max 2 tabs in 24 hours TOPAMAX 50 mg tablet Generic drug:  topiramate Your last dose was: Your next dose is: Take 50 mg by mouth daily. 50 mg Where to Get Your Medications Information on where to get these meds will be given to you by the nurse or doctor. ! Ask your nurse or doctor about these medications  
  oxyCODONE-acetaminophen 5-325 mg per tablet Discharge Instructions Cholecystectomy: What to Expect at [facility] Your Recovery After your surgery, it is normal to feel weak and tired for several days after you return home. Your belly may be swollen. If you had laparoscopic surgery, you may also have pain in your shoulder for about 24 hours. You may have gas or need to burp a lot at first, and a few people get diarrhea. The diarrhea usually goes away in 2 to 4 weeks, but it may last longer. How quickly you recover depends on whether you had a laparoscopic or open surgery. · For a laparoscopic surgery, most people can go back to work or their normal routine in 1 to 2 weeks, but it may take longer, depending on the type of work you do. · For an open surgery, it will probably take 4 to 6 weeks before you get back to your normal routine. This care sheet gives you a general idea about how long it will take for you to recover. However, each person recovers at a different pace. Follow the steps below to get better as quickly as possible. How can you care for yourself at home? Activity ? · Rest when you feel tired. Getting enough sleep will help you recover. ? · Try to walk each day. Start out by walking a little more than you did the day before. Gradually increase the amount you walk. Walking boosts blood flow and helps prevent pneumonia and constipation. ? · For about 2 to 4 weeks, avoid lifting anything that would make you strain. This may include a child, heavy grocery bags and milk containers, a heavy briefcase or backpack, cat litter or dog food bags, or a vacuum . ? · Avoid strenuous activities, such as biking, jogging, weightlifting, and aerobic exercise, until your doctor says it is okay. ? · You may shower 24 to 48 hours after surgery, if your doctor okays it. Pat the cut (incision) dry. Do not take a bath for the first 2 weeks, or until your doctor tells you it is okay. ? · You may drive when you are no longer taking pain medicine and can quickly move your foot from the gas pedal to the brake. You must also be able to sit comfortably for a long period of time, even if you do not plan to go far. You might get caught in traffic. ? · For a laparoscopic surgery, most people can go back to work or their normal routine in 1 to 2 weeks, but it may take longer. For an open surgery, it will probably take 4 to 6 weeks before you get back to your normal routine. ? · Your doctor will tell you when you can have sex again. ? Diet ? · Eat smaller meals more often instead of fewer larger meals. You can eat a normal diet, but avoid eating fatty foods for about 1 month. Fatty foods include hamburger, whole milk, cheese, and many snack foods. If your stomach is upset, try bland, low-fat foods like plain rice, broiled chicken, toast, and yogurt. ? · Drink plenty of fluids (unless your doctor tells you not to). ? · If you have diarrhea, try avoiding spicy foods, dairy products, fatty foods, and alcohol. You can also watch to see if specific foods cause it, and stop eating them. If the diarrhea continues for more than 2 weeks, talk to your doctor. ? · You may notice that your bowel movements are not regular right after your surgery. This is common. Try to avoid constipation and straining with bowel movements. You may want to take a fiber supplement every day. If you have not had a bowel movement after a couple of days, ask your doctor about taking a mild laxative. Medicines ? · Your doctor will tell you if and when you can restart your medicines. He or she will also give you instructions about taking any new medicines. ? · If you take blood thinners, such as warfarin (Coumadin), clopidogrel (Plavix), or aspirin, be sure to talk to your doctor. He or she will tell you if and when to start taking those medicines again. Make sure that you understand exactly what your doctor wants you to do. ? · Take pain medicines exactly as directed. ¨ If the doctor gave you a prescription medicine for pain, take it as prescribed. ¨ If you are not taking a prescription pain medicine, take an over-the-counter medicine such as acetaminophen (Tylenol), ibuprofen (Advil, Motrin), or naproxen (Aleve). Read and follow all instructions on the label. ¨ Do not take two or more pain medicines at the same time unless the doctor told you to.  Many pain medicines contain acetaminophen, which is Tylenol. Too much Tylenol can be harmful. ? · If you think your pain medicine is making you sick to your stomach: 
¨ Take your medicine after meals (unless your doctor tells you not to). ¨ Ask your doctor for a different pain medicine. ? · If your doctor prescribed antibiotics, take them as directed. Do not stop taking them just because you feel better. You need to take the full course of antibiotics. Incision care ? · If you have strips of tape on the incision, or cut, leave the tape on for a week or until it falls off.  
? · After 24 to 48 hours, wash the area daily with warm, soapy water, and pat it dry. ? · You may have staples to hold the cut together. Keep them dry until your doctor takes them out. This is usually in 7 to 10 days. ? · Keep the area clean and dry. You may cover it with a gauze bandage if it weeps or rubs against clothing. Change the bandage every day. ?Ice ? · To reduce swelling and pain, put ice or a cold pack on your belly for 10 to 20 minutes at a time. Do this every 1 to 2 hours. Put a thin cloth between the ice and your skin. Follow-up care is a key part of your treatment and safety. Be sure to make and go to all appointments, and call your doctor if you are having problems. It's also a good idea to know your test results and keep a list of the medicines you take. When should you call for help? Call 911 anytime you think you may need emergency care. For example, call if: 
? · You passed out (lost consciousness). ? · You are short of breath. Megan James ? Call your doctor now or seek immediate medical care if: 
? · You are sick to your stomach and cannot drink fluids. ? · You have pain that does not get better when you take your pain medicine. ? · You cannot pass stools or gas. ? · You have signs of infection, such as: 
¨ Increased pain, swelling, warmth, or redness. ¨ Red streaks leading from the incision. ¨ Pus draining from the incision. ¨ A fever. ? · Bright red blood has soaked through the bandage over your incision. ? · You have loose stitches, or your incision comes open. ? · You have signs of a blood clot in your leg (called a deep vein thrombosis), such as: 
¨ Pain in your calf, back of knee, thigh, or groin. ¨ Redness and swelling in your leg or groin. ? Watch closely for any changes in your health, and be sure to contact your doctor if you have any problems. Where can you learn more? Go to http://angela-armando.info/. Enter [phone] in the search box to learn more about \"Cholecystectomy: What to Expect at Home. \" Current as of: May 12, 2017 Content Version: 11.4 © 6129-3892 [employer]. Care instructions adapted under license by [employer] (which disclaims liability or warranty for this information). If you have questions about a medical condition or this instruction, always ask your healthcare professional. [place] [address] any warranty or liability for your use of this information. DISCHARGE SUMMARY from Nurse PATIENT INSTRUCTIONS: 
 
 
F-face looks uneven A-arms unable to move or move unevenly S-speech slurred or non-existent T-time-call 911 as soon as signs and symptoms begin-DO NOT go Back to bed or wait to see if you get better-TIME IS BRAIN. Warning Signs of HEART ATTACK Call 911 if you have these symptoms: 
? Chest discomfort. Most heart attacks involve discomfort in the center of the chest that lasts more than a few minutes, or that goes away and comes back. It can feel like uncomfortable pressure, squeezing, fullness, or pain. ? Discomfort in other areas of the upper body. Symptoms can include pain or discomfort in one or both arms, the back, neck, jaw, or stomach. ? Shortness of breath with or without chest discomfort. ? Other signs may include breaking out in a cold sweat, nausea, or lightheadedness. Don't wait more than five minutes to call [address]! Fast action can save your life. Calling 911 is almost always the fastest way to get lifesaving treatment. Emergency Medical Services staff can begin treatment when they arrive  up to an hour sooner than if someone gets to the hospital by car. The discharge information has been reviewed with the patient and spouse. The patient and spouse verbalized understanding. Discharge medications reviewed with the patient and spouse and appropriate educational materials and side effects teaching were provided. ___________________________________________________________________________________________________________________________________ Introducing [facility] SERVICES! Dear Tracey Tinsley: Thank you for requesting a Xpresso account. Our records indicate that you already have an active Xpresso account. You can access your account anytime at [email or web address] Did you know that you can access your hospital and ER discharge instructions at any time in Xpresso? You can also review all of your test results from your hospital stay or ER visit. Additional Information If you have questions, please visit the Frequently Asked Questions section of the Xpresso website at [email or web address] Remember, Xpresso is NOT to be used for urgent needs. For medical emergencies, dial 911. Now available from your iPhone and Android! Providers Seen During Your Hospitalization Provider Specialty Primary office phone Reyna Goncalves MD Surgery [phone] Your Primary Care Physician (PCP) Primary Care Physician Office Phone Office Fax Abel Elizondo [phone] You are allergic to the following Allergen Reactions Latex Contact Dermatitis Dilantin (Phenytoin Sodium Extended) Anaphylaxis Phenobarbital Anaphylaxis Recent Documentation Height Weight BMI OB Status Smoking Status 1.6 m 70 kg 27.35 kg/m2 Having regular periods Never Smoker Emergency Contacts Name Discharge Info Relation Home Work Mobile Turner Ayony DISCHARGE CAREGIVER [3] Spouse [3] [phone] Patient Belongings The following personal items are in your possession at time of discharge: 
  Dental Appliances: None  Visual Aid: Glasses      Home Medications: None   Jewelry: None  Clothing: Pants, Shirt, Undergarments, Footwear    Other Valuables: Cell Phone Please provide this summary of care documentation to your next provider. Signatures-by signing, you are acknowledging that this After Visit Summary has been reviewed with you and you have received a copy. Patient Signature:  ____________________________________________________________ Date:  ____________________________________________________________  
  
[facility] Provider Signature:  ____________________________________________________________ Date:  ____________________________________________________________

## 2017-11-07 NOTE — DISCHARGE INSTRUCTIONS
Cholecystectomy: What to Expect at [address] [place] [address]  After your surgery, it is normal to feel weak and tired for several days after you return home. Your belly may be swollen. If you had laparoscopic surgery, you may also have pain in your shoulder for about 24 hours. You may have gas or need to burp a lot at first, and a few people get diarrhea. The diarrhea usually goes away in 2 to 4 weeks, but it may last longer. How quickly you recover depends on whether you had a laparoscopic or open surgery. · For a laparoscopic surgery, most people can go back to work or their normal routine in 1 to 2 weeks, but it may take longer, depending on the type of work you do. · For an open surgery, it will probably take 4 to 6 weeks before you get back to your normal routine. This care sheet gives you a general idea about how long it will take for you to recover. However, each person recovers at a different pace. Follow the steps below to get better as quickly as possible. How can you care for yourself at home? Activity  ? · Rest when you feel tired. Getting enough sleep will help you recover. ? · Try to walk each day. Start out by walking a little more than you did the day before. Gradually increase the amount you walk. Walking boosts blood flow and helps prevent pneumonia and constipation. ? · For about 2 to 4 weeks, avoid lifting anything that would make you strain. This may include a child, heavy grocery bags and milk containers, a heavy briefcase or backpack, cat litter or dog food bags, or a vacuum . ? · Avoid strenuous activities, such as biking, jogging, weightlifting, and aerobic exercise, until your doctor says it is okay. ? · You may shower 24 to 48 hours after surgery, if your doctor okays it. Pat the cut (incision) dry. Do not take a bath for the first 2 weeks, or until your doctor tells you it is okay.    ? · You may drive when you are no longer taking pain medicine and can quickly move your foot from the gas pedal to the brake. You must also be able to sit comfortably for a long period of time, even if you do not plan to go far. You might get caught in traffic. ? · For a laparoscopic surgery, most people can go back to work or their normal routine in 1 to 2 weeks, but it may take longer. For an open surgery, it will probably take 4 to 6 weeks before you get back to your normal routine. ? · Your doctor will tell you when you can have sex again. ? Diet  ? · Eat smaller meals more often instead of fewer larger meals. You can eat a normal diet, but avoid eating fatty foods for about 1 month. Fatty foods include hamburger, whole milk, cheese, and many snack foods. If your stomach is upset, try bland, low-fat foods like plain rice, broiled chicken, toast, and yogurt. ? · Drink plenty of fluids (unless your doctor tells you not to). ? · If you have diarrhea, try avoiding spicy foods, dairy products, fatty foods, and alcohol. You can also watch to see if specific foods cause it, and stop eating them. If the diarrhea continues for more than 2 weeks, talk to your doctor. ? · You may notice that your bowel movements are not regular right after your surgery. This is common. Try to avoid constipation and straining with bowel movements. You may want to take a fiber supplement every day. If you have not had a bowel movement after a couple of days, ask your doctor about taking a mild laxative. Medicines  ? · Your doctor will tell you if and when you can restart your medicines. He or she will also give you instructions about taking any new medicines. ? · If you take blood thinners, such as warfarin (Coumadin), clopidogrel (Plavix), or aspirin, be sure to talk to your doctor. He or she will tell you if and when to start taking those medicines again. Make sure that you understand exactly what your doctor wants you to do. ? · Take pain medicines exactly as directed.   ¨ If the doctor gave you a prescription medicine for pain, take it as prescribed. ¨ If you are not taking a prescription pain medicine, take an over-the-counter medicine such as acetaminophen (Tylenol), ibuprofen (Advil, Motrin), or naproxen (Aleve). Read and follow all instructions on the label. ¨ Do not take two or more pain medicines at the same time unless the doctor told you to. Many pain medicines contain acetaminophen, which is Tylenol. Too much Tylenol can be harmful. ? · If you think your pain medicine is making you sick to your stomach:  ¨ Take your medicine after meals (unless your doctor tells you not to). ¨ Ask your doctor for a different pain medicine. ? · If your doctor prescribed antibiotics, take them as directed. Do not stop taking them just because you feel better. You need to take the full course of antibiotics. Incision care  ? · If you have strips of tape on the incision, or cut, leave the tape on for a week or until it falls off.   ? · After 24 to 48 hours, wash the area daily with warm, soapy water, and pat it dry. ? · You may have staples to hold the cut together. Keep them dry until your doctor takes them out. This is usually in 7 to 10 days. ? · Keep the area clean and dry. You may cover it with a gauze bandage if it weeps or rubs against clothing. Change the bandage every day. ?Ice  ? · To reduce swelling and pain, put ice or a cold pack on your belly for 10 to 20 minutes at a time. Do this every 1 to 2 hours. Put a thin cloth between the ice and your skin. Follow-up care is a key part of your treatment and safety. Be sure to make and go to all appointments, and call your doctor if you are having problems. It's also a good idea to know your test results and keep a list of the medicines you take. When should you call for help? Call 911 anytime you think you may need emergency care. For example, call if:  ? · You passed out (lost consciousness). ? · You are short of breath. Trisha Bobby ? Call your doctor now or seek immediate medical care if:  ? · You are sick to your stomach and cannot drink fluids. ? · You have pain that does not get better when you take your pain medicine. ? · You cannot pass stools or gas. ? · You have signs of infection, such as:  ¨ Increased pain, swelling, warmth, or redness. ¨ Red streaks leading from the incision. ¨ Pus draining from the incision. ¨ A fever. ? · Bright red blood has soaked through the bandage over your incision. ? · You have loose stitches, or your incision comes open. ? · You have signs of a blood clot in your leg (called a deep vein thrombosis), such as:  ¨ Pain in your calf, back of knee, thigh, or groin. ¨ Redness and swelling in your leg or groin. ? Watch closely for any changes in your health, and be sure to contact your doctor if you have any problems. Where can you learn more? Go to http://angela-armando.info/. Enter 712 63 010 in the search box to learn more about \"Cholecystectomy: What to Expect at Home. \"  Current as of: May 12, 2017  Content Version: 11.4  © 1389-6915 [employer]. Care instructions adapted under license by [employer] (which disclaims liability or warranty for this information). If you have questions about a medical condition or this instruction, always ask your healthcare professional. [address] any warranty or liability for your use of this information. DISCHARGE SUMMARY from Nurse    PATIENT INSTRUCTIONS:    After general anesthesia or intravenous sedation, for 24 hours or while taking prescription Narcotics:  · Limit your activities  · Do not drive and operate hazardous machinery  · Do not make important personal or business decisions  · Do  not drink alcoholic beverages  · If you have not urinated within 8 hours after discharge, please contact your surgeon on call.     Report the following to your surgeon:  · Excessive pain, swelling, redness or odor of or around the surgical area  · Temperature over 100.5  · Nausea and vomiting lasting longer than 4 hours or if unable to take medications  · Any signs of decreased circulation or nerve impairment to extremity: change in color, persistent  numbness, tingling, coldness or increase pain  · Any questions    *  Please give a list of your current medications to your Primary Care Provider. *  Please update this list whenever your medications are discontinued, doses are      changed, or new medications (including over-the-counter products) are added. *  Please carry medication information at all times in case of emergency situations. These are general instructions for a healthy lifestyle:    No smoking/ No tobacco products/ Avoid exposure to second hand smoke  Surgeon General's Warning:  Quitting smoking now greatly reduces serious risk to your health. Obesity, smoking, and sedentary lifestyle greatly increases your risk for illness    A healthy diet, regular physical exercise & weight monitoring are important for maintaining a healthy lifestyle    You may be retaining fluid if you have a history of heart failure or if you experience any of the following symptoms:  Weight gain of 3 pounds or more overnight or 5 pounds in a week, increased swelling in our hands or feet or shortness of breath while lying flat in bed. Please call your doctor as soon as you notice any of these symptoms; do not wait until your next office visit. Recognize signs and symptoms of STROKE:    F-face looks uneven    A-arms unable to move or move unevenly    S-speech slurred or non-existent    T-time-call 911 as soon as signs and symptoms begin-DO NOT go       Back to bed or wait to see if you get better-TIME IS BRAIN. Warning Signs of HEART ATTACK     Call 911 if you have these symptoms:   Chest discomfort.  Most heart attacks involve discomfort in the center of the chest that lasts more than a few minutes, or that goes away and comes back. It can feel like uncomfortable pressure, squeezing, fullness, or pain.  Discomfort in other areas of the upper body. Symptoms can include pain or discomfort in one or both arms, the back, neck, jaw, or stomach.  Shortness of breath with or without chest discomfort.  Other signs may include breaking out in a cold sweat, nausea, or lightheadedness. Don't wait more than five minutes to call 911 - MINUTES MATTER! Fast action can save your life. Calling 911 is almost always the fastest way to get lifesaving treatment. Emergency Medical Services staff can begin treatment when they arrive -- up to an hour sooner than if someone gets to the hospital by car. The discharge information has been reviewed with the patient and spouse. The patient and spouse verbalized understanding. Discharge medications reviewed with the patient and spouse and appropriate educational materials and side effects teaching were provided.   ___________________________________________________________________________________________________________________________________

## 2017-11-07 NOTE — H&P (VIEW-ONLY)
Progress Note    Patient: Kevin Macias  MRN: [id number]  SSN: [id number]   YOB: 1985  Age: 27 y.o. Sex: female     Chief Complaint   Patient presents with    Abdominal Pain     hida scan       HPI    Ms Anu Yeung is a 79-year-old woman who presents with 7 months of right upper quadrant pain and some belching and reflux type symptoms. She has had a workup to include an ultrasound that shows no stones but a HIDA scan that shows a 5% ejection fraction and caused her pain during CCK stimulation. 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 She has not had a seizure since she was 4years old. She has been followed by a neurologist and is recently been sent to a neurosurgeon. I have discussed with her PCP the situation and her candidacy for surgery. She looks like a normal healthy woman and I have been reassured that she is done very well medically. Past Medical History:   Diagnosis Date    Asthma     Neurological disorder      Past Surgical History:   Procedure Laterality Date    HX HEENT      tonsilectomy    HX OTHER SURGICAL      HX WISDOM TEETH EXTRACTION  2002     Allergies   Allergen Reactions    Latex Contact Dermatitis    Dilantin [Phenytoin Sodium Extended] Anaphylaxis    Phenobarbital Anaphylaxis     Current Outpatient Prescriptions   Medication Sig Dispense Refill    fluticasone-salmeterol (ADVAIR DISKUS) 250-50 mcg/dose diskus inhaler Take 1 Puff by inhalation every twelve (12) hours.  loratadine (CLARITIN) 10 mg tablet Take 10 mg by mouth.  albuterol (PROVENTIL VENTOLIN) 2.5 mg /3 mL (0.083 %) nebulizer solution 3 mL by Nebulization route every four (4) hours as needed for Wheezing.  24 Each 0    OTHER Birth control pills---Lo lo estrin      topiramate (TOPAMAX) 25 mg tablet Take 25 mg qhs x 7 days, then take 50 mg qhs x 7 days, then take 75 mg qhs x 7 days, then take 100 mg qhs thereafter. 70 Tab 0    prenatal vit-calcium-iron-fa (PRENATAL PLUS, CALCIUM CARB,) 27 mg iron- 1 mg tab Take 1 Tab by mouth daily. 100 Tab 10    topiramate (TOPAMAX) 100 mg tablet Take 1 Tab by mouth nightly. 30 Tab 3    SUMAtriptan (IMITREX) 100 mg tablet Take one tablet at HA onset may repeat > 2 hours if needed. Max 2 tabs in 24 hours 9 Tab 2     Social History     Social History    Marital status: SINGLE     Spouse name: N/A    Number of children: N/A    Years of education: N/A     Occupational History    Not on file. Social History Main Topics    Smoking status: Never Smoker    Smokeless tobacco: Never Used    Alcohol use No    Drug use: No    Sexual activity: Not on file     Other Topics Concern    Not on file     Social History Narrative     Family History   Problem Relation Age of Onset    Diabetes Mother     Diabetes Maternal Grandmother     Cancer Maternal Grandmother     Stroke Paternal Grandmother     Diabetes Maternal Aunt          Review of systems:  Patient denies any reflux, emesis, abdominal pain, change in bowel habits, hematochezia, melena, fever, weight loss, fatigue chills, dermatitis, abnormal moles, change in vision, vertigo, epistaxis, dysphagia, hoarseness, chest pain, palpitations, hypertension, edema, cough, shortness of breath, wheezing, hemoptysis, snoring, hematuria, diabetes, thyroid disease, anemia, bruising, history of blood transfusion, dizziness, headache, or fainting.     Physical Examination    Well developed well nourished female in no apparent distress  Visit Vitals    /74    Resp 16    LMP 10/05/2017      Head: normocephalic, atraumatic  Mouth: Clear, no overt lesions, oral mucosa pink and moist  Neck: supple, no masses, no adenopathy or carotid bruits, trachea midline  Resp: clear to auscultation bilaterally, no wheeze, rhonchi or rales, excursions normal and symmetrical  Cardio: Regular rate and rhythm, no murmurs, clicks, gallops or rubs, no edema or varicosities  Abdomen: soft, nontender, nondistended, normoactive bowel sounds, no hernias, no hepatosplenomegaly,   Back: Deferred  Extremeties: warm, well-perfused, no tenderness or swelling, normal gait/station  Neuro: sensation and strength grossly intact and symmetrical  Psych: alert and oriented to person, place and time  Breast exam deferred    IMPRESSION  Biliary dyskinesia    PLAN  No orders of the defined types were placed in this encounter.     Laparoscopic cholecystectomy  Srinivasan Peralta MD

## 2017-11-07 NOTE — ANESTHESIA POSTPROCEDURE EVALUATION
Post-Anesthesia Evaluation and Assessment    Patient: Corinne Dan MRN: [id number]  SSN: [id number]    YOB: 1985  Age: 34 y.o. Sex: female       Cardiovascular Function/Vital Signs  Visit Vitals    /59    Pulse 61    Temp 36.3 °C (97.4 °F)    Resp 12    Ht 5' 3\" (1.6 m)    Wt 70 kg (154 lb 6.4 oz)    SpO2 96%    BMI 27.35 kg/m2       Patient is status post general anesthesia for Procedure(s):  LAPAROSCOPIC CHOLECYSTECTOMY . Nausea/Vomiting: None    Postoperative hydration reviewed and adequate. Pain:  Pain Scale 1: Numeric (0 - 10) (11/07/17 0911)  Pain Intensity 1: 3 (11/07/17 0911)   Managed    Neurological Status:   Neuro (WDL): Within Defined Limits (11/07/17 0854)   At baseline    Mental Status and Level of Consciousness: Arousable    Pulmonary Status:   O2 Device: Room air (11/07/17 0911)   Adequate oxygenation and airway patent    Complications related to anesthesia: None    Post-anesthesia assessment completed.  No concerns    Signed By: Rudy Silva MD     November 7, 2017

## 2017-11-07 NOTE — ANESTHESIA PREPROCEDURE EVALUATION
Anesthetic History   No history of anesthetic complications            Review of Systems / Medical History  Patient summary reviewed and pertinent labs reviewed    Pulmonary  Within defined limits          Asthma : well controlled       Neuro/Psych   Within defined limits  seizures: well controlled         Cardiovascular  Within defined limits                Exercise tolerance: >4 METS     GI/Hepatic/Renal  Within defined limits              Endo/Other  Within defined limits           Other Findings   Comments:   Risk Factors for Postoperative nausea/vomiting:       History of postoperative nausea/vomiting? NO       Female? YES       Motion sickness? YES       Intended opioid administration for postoperative analgesia? YES      Smoking Abstinence  Current Smoker? NO  Elective Surgery? YES  Seen preoperatively by anesthesiologist or proxy prior to day of surgery? YES  Pt abstained from smoking 24 hours prior to anesthesia?  YES               Physical Exam    Airway  Mallampati: II  TM Distance: 4 - 6 cm  Neck ROM: normal range of motion   Mouth opening: Normal     Cardiovascular    Rhythm: regular  Rate: normal         Dental  No notable dental hx       Pulmonary  Breath sounds clear to auscultation               Abdominal  GI exam deferred       Other Findings            Anesthetic Plan    ASA: 2  Anesthesia type: general          Induction: Intravenous  Anesthetic plan and risks discussed with: Patient

## 2017-11-08 NOTE — OP NOTES
[address]    Name:  Kiko Alexander  MR#:  [id number]  :  1985  Account #:  [de-identified]  Date of Adm:  2017  Date of Surgery:  2017      PREOPERATIVE DIAGNOSIS: Biliary dyskinesia. POSTOPERATIVE DIAGNOSIS: Biliary dyskinesia. PROCEDURES PERFORMED: Laparoscopic cholecystectomy. ESTIMATED BLOOD LOSS: Minimal.    SPECIMENS REMOVED: Gallbladder. ANESTHESIA: General.    SURGEON: Gonzalo Ng MD    ASSISTANT:     COMPLICATIONS: None. Ms. Ja Thomas is a 65-year-old woman with no gallstones by  ultrasound, but a 5% ejection fraction and biliary colic by clinical  history. She is here for laparoscopic cholecystectomy. DESCRIPTION OF PROCEDURE: After appropriate antibiotics and  sequential compression stockings, Ms. Ja Thomas was taken to the  operating room, placed in a supine position on the OR table. After  adequate general anesthesia, her abdomen was prepped and draped  to [facility] standard. An infraumbilical incision was created and blunt  dissection of the fascia performed. The fascia was stay stitched with 0  Vicryl and opened sharply with Metzenbaum scissors. A Babatunde port  was placed and a pneumoperitoneum induced. Generalized  exploration revealed no obvious pathology. Her gallbladder was not  inflamed. A second 12 mm port was placed in the subxiphoid region  and 2 5 mm ports placed in the right subcostal region, all under direct  vision. The gallbladder was retracted cephalad and the selam hepatis  was dissected with the [employer]. The cystic duct and artery  came easily into view and the cystic duct, common duct junction was  clear. The cystic artery was isolated with a [employer], and  doubly clipped and cut. The remainder of the triangle of Calot was  completely dissected back to the liver bed, and no other structures  were found.  The cystic duct was then doubly clipped and cut, and the  gallbladder was removed from the gallbladder fossa using the Bovie  electrocautery. It was removed from the abdomen by way of an Endo  Catch through the umbilical port site. Hemostasis was good. The clips  appeared in good position and there was no bile leak. Copious  irrigation of the right upper quadrant was performed and suctioned  free. The trocars were withdrawn under direct vision, and hemostasis  was good. The 2 large port sites were then closed with 0 Vicryl. The  remainder of the skin and port sites were closed with 4-0 Monocryl. Steri-Strips and sterile dressings were applied. Quarter percent  Marcaine with epinephrine was used around the wound. She tolerated  the procedure well and was taken to Recovery in stable condition.         MD Maryann Vega / MARY  D:  11/08/2017   07:27  T:  11/08/2017   11:56  Job #:  [id number]

## 2017-11-20 ENCOUNTER — OFFICE VISIT (OUTPATIENT)
Dept: SURGERY | Age: 32
End: 2017-11-20

## 2017-11-20 VITALS — OXYGEN SATURATION: 16 % | TEMPERATURE: 97.2 F | DIASTOLIC BLOOD PRESSURE: 70 MMHG | SYSTOLIC BLOOD PRESSURE: 107 MMHG

## 2017-11-20 DIAGNOSIS — K82.8 BILIARY DYSKINESIA: ICD-10-CM

## 2017-11-20 DIAGNOSIS — Z09 POSTOPERATIVE EXAMINATION: Primary | ICD-10-CM

## 2017-11-20 NOTE — PROGRESS NOTES
[facility]  PROGRESS NOTE    Name: David Puente MRN: [id number]   : 1985 [facility]   Date: 2017 Admission Date: No admission date for patient encounter. Subjective:  Ms. Katina Calvo is a very pleasant 33 yo white female here today almost 3 weeks out from laparoscopic cholecystectomy. She says she had a very trying first week after surgery, but seems to be doing much better today. She is not yet able to eat whatever she wants and we discussed the body's response to no longer having a gall bladder and the time it takes to find its new normal. Food trials will be necessary and avoidance may be best at this time. She denies any issues with her incisions at all and she is eating, sleeping and having normal bowel movements. 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 We discussed her activity restrictions and how to do things without engaging her core. She understands and is amenable to this plan. Objective:  Vitals:    17 0946   BP: 107/70   Temp: 97.2 °F (36.2 °C)   TempSrc: Oral   SpO2: (!) 16%        Physical Exam:   General:  Well developed well nourished female in no apparent distress   Abdomen: abdomen is soft with no tenderness. No masses, organomegaly or guarding. Incisions are all well healed. Labs:  No results found for this or any previous visit (from the past 24 hour(s)). Current Medications:  Current Outpatient Prescriptions   Medication Sig Dispense Refill    topiramate (TOPAMAX) 50 mg tablet Take 50 mg by mouth daily.  fluticasone-salmeterol (ADVAIR DISKUS) 250-50 mcg/dose diskus inhaler Take 1 Puff by inhalation daily.  loratadine (CLARITIN) 10 mg tablet Take 10 mg by mouth.  albuterol (PROVENTIL VENTOLIN) 2.5 mg /3 mL (0.083 %) nebulizer solution 3 mL by Nebulization route every four (4) hours as needed for Wheezing.  24 Each 0    OTHER Birth control pills---Lo lo estrin      SUMAtriptan (IMITREX) 100 mg tablet Take one tablet at HA onset may repeat > 2 hours if needed. Max 2 tabs in 24 hours 9 Tab 2    prenatal vit-calcium-iron-fa (PRENATAL PLUS, CALCIUM CARB,) 27 mg iron- 1 mg tab Take 1 Tab by mouth daily. 100 Tab 10    oxyCODONE-acetaminophen (PERCOCET) 5-325 mg per tablet Take 1 Tab by mouth every six (6) hours as needed for Pain. Max Daily Amount: 4 Tabs. 30 Tab 0       Chart and notes reviewed. Data reviewed. I have evaluated and examined the patient. IMPRESSION:   · Patient almost 3 weeks out from laparoscopic cholecystectomy here today doing well. PLAN:/DISCUSION:   · Continue to honor activity restrictions; no core engagement  · May use ice, tylenol or ibuprofen for comfort  · Follow up week of Dec 18     Ms. Haley Menard has a reminder for a \"due or due soon\" health maintenance. I have asked that she contact her primary care provider for follow-up on this health maintenance. Polina Tovar.  Alpha Cough, FNP-C

## 2017-11-20 NOTE — PATIENT INSTRUCTIONS
Cholecystectomy: What to Expect at [address] [place] [address]  After your surgery, it is normal to feel weak and tired for several days after you return home. Your belly may be swollen. If you had laparoscopic surgery, you may also have pain in your shoulder for about 24 hours. You may have gas or need to burp a lot at first, and a few people get diarrhea. The diarrhea usually goes away in 2 to 4 weeks, but it may last longer. How quickly you recover depends on whether you had a laparoscopic or open surgery. · For a laparoscopic surgery, most people can go back to work or their normal routine in 1 to 2 weeks, but it may take longer, depending on the type of work you do. · For an open surgery, it will probably take 4 to 6 weeks before you get back to your normal routine. This care sheet gives you a general idea about how long it will take for you to recover. However, each person recovers at a different pace. Follow the steps below to get better as quickly as possible. How can you care for yourself at home? Activity  ? · Rest when you feel tired. Getting enough sleep will help you recover. ? · Try to walk each day. Start out by walking a little more than you did the day before. Gradually increase the amount you walk. Walking boosts blood flow and helps prevent pneumonia and constipation. ? · For about 2 to 4 weeks, avoid lifting anything that would make you strain. This may include a child, heavy grocery bags and milk containers, a heavy briefcase or backpack, cat litter or dog food bags, or a vacuum . ? · Avoid strenuous activities, such as biking, jogging, weightlifting, and aerobic exercise, until your doctor says it is okay. ? · You may shower 24 to 48 hours after surgery, if your doctor okays it. Pat the cut (incision) dry. Do not take a bath for the first 2 weeks, or until your doctor tells you it is okay.    ? · You may drive when you are no longer taking pain medicine and can quickly move your foot from the gas pedal to the brake. You must also be able to sit comfortably for a long period of time, even if you do not plan to go far. You might get caught in traffic. ? · For a laparoscopic surgery, most people can go back to work or their normal routine in 1 to 2 weeks, but it may take longer. For an open surgery, it will probably take 4 to 6 weeks before you get back to your normal routine. ? · Your doctor will tell you when you can have sex again. ? Diet  ? · Eat smaller meals more often instead of fewer larger meals. You can eat a normal diet, but avoid eating fatty foods for about 1 month. Fatty foods include hamburger, whole milk, cheese, and many snack foods. If your stomach is upset, try bland, low-fat foods like plain rice, broiled chicken, toast, and yogurt. ? · Drink plenty of fluids (unless your doctor tells you not to). ? · If you have diarrhea, try avoiding spicy foods, dairy products, fatty foods, and alcohol. You can also watch to see if specific foods cause it, and stop eating them. If the diarrhea continues for more than 2 weeks, talk to your doctor. ? · You may notice that your bowel movements are not regular right after your surgery. This is common. Try to avoid constipation and straining with bowel movements. You may want to take a fiber supplement every day. If you have not had a bowel movement after a couple of days, ask your doctor about taking a mild laxative. Medicines  ? · Your doctor will tell you if and when you can restart your medicines. He or she will also give you instructions about taking any new medicines. ? · If you take blood thinners, such as warfarin (Coumadin), clopidogrel (Plavix), or aspirin, be sure to talk to your doctor. He or she will tell you if and when to start taking those medicines again. Make sure that you understand exactly what your doctor wants you to do. ? · Take pain medicines exactly as directed.   ¨ If the doctor gave you a prescription medicine for pain, take it as prescribed. ¨ If you are not taking a prescription pain medicine, take an over-the-counter medicine such as acetaminophen (Tylenol), ibuprofen (Advil, Motrin), or naproxen (Aleve). Read and follow all instructions on the label. ¨ Do not take two or more pain medicines at the same time unless the doctor told you to. Many pain medicines contain acetaminophen, which is Tylenol. Too much Tylenol can be harmful. ? · If you think your pain medicine is making you sick to your stomach:  ¨ Take your medicine after meals (unless your doctor tells you not to). ¨ Ask your doctor for a different pain medicine. ? · If your doctor prescribed antibiotics, take them as directed. Do not stop taking them just because you feel better. You need to take the full course of antibiotics. Incision care  ? · If you have strips of tape on the incision, or cut, leave the tape on for a week or until it falls off.   ? · After 24 to 48 hours, wash the area daily with warm, soapy water, and pat it dry. ? · You may have staples to hold the cut together. Keep them dry until your doctor takes them out. This is usually in 7 to 10 days. ? · Keep the area clean and dry. You may cover it with a gauze bandage if it weeps or rubs against clothing. Change the bandage every day. ?Ice  ? · To reduce swelling and pain, put ice or a cold pack on your belly for 10 to 20 minutes at a time. Do this every 1 to 2 hours. Put a thin cloth between the ice and your skin. Follow-up care is a key part of your treatment and safety. Be sure to make and go to all appointments, and call your doctor if you are having problems. It's also a good idea to know your test results and keep a list of the medicines you take. When should you call for help? Call 911 anytime you think you may need emergency care. For example, call if:  ? · You passed out (lost consciousness). ? · You are short of breath. [place] ? Call your doctor now or seek immediate medical care if:  ? · You are sick to your stomach and cannot drink fluids. ? · You have pain that does not get better when you take your pain medicine. ? · You cannot pass stools or gas. ? · You have signs of infection, such as:  ¨ Increased pain, swelling, warmth, or redness. ¨ Red streaks leading from the incision. ¨ Pus draining from the incision. ¨ A fever. ? · Bright red blood has soaked through the bandage over your incision. ? · You have loose stitches, or your incision comes open. ? · You have signs of a blood clot in your leg (called a deep vein thrombosis), such as:  ¨ Pain in your calf, back of knee, thigh, or groin. ¨ Redness and swelling in your leg or groin. ? Watch closely for any changes in your health, and be sure to contact your doctor if you have any problems. Where can you learn more? Go to http://angela-armando.info/. Enter 015 [id number] 216 in the search box to learn more about \"Cholecystectomy: What to Expect at Home. \"  Current as of: May 12, 2017  Content Version: 11.4  © 0959-7050 [employer]. Care instructions adapted under license by [employer] (which disclaims liability or warranty for this information). If you have questions about a medical condition or this instruction, always ask your healthcare professional. [address] any warranty or liability for your use of this information.

## 2017-12-19 ENCOUNTER — OFFICE VISIT (OUTPATIENT)
Dept: SURGERY | Age: 32
End: 2017-12-19

## 2017-12-19 VITALS
WEIGHT: 150 LBS | TEMPERATURE: 98 F | RESPIRATION RATE: 18 BRPM | BODY MASS INDEX: 26.57 KG/M2 | DIASTOLIC BLOOD PRESSURE: 62 MMHG | SYSTOLIC BLOOD PRESSURE: 100 MMHG | HEART RATE: 64 BPM

## 2017-12-19 DIAGNOSIS — Z09 POSTOPERATIVE EXAMINATION: Primary | ICD-10-CM

## 2017-12-19 DIAGNOSIS — R53.1 WEAKNESS GENERALIZED: ICD-10-CM

## 2017-12-19 NOTE — LETTER
NOTIFICATION RETURN TO WORK / SCHOOL 
 
12/19/2017 2:51 PM 
 
Ms. Charbel Haq [address] [facility] [address] To Whom It May Concern: 
 
Charbel Haq is currently under the care of Veronica N Charlie Arceo. She was evaluated in the office today, December 19, 2017 and will not be able to return to work at this time. She will have physical therapy and be re-evaluated once PT offers a proposed plan. If there are questions or concerns please have the patient contact our office. Sincerely, Jos Solorio NP

## 2017-12-19 NOTE — PROGRESS NOTES
Harsh Pillai. YOUNG Wang  PROGRESS NOTE    Name: Luis Daniel Yuen MRN: [id number]   : 1985 Hospital: [facility]   Date: 2017 Admission Date: No admission date for patient encounter. Subjective:  Ms. Luis Herron is a very pleasant 76-year-old white female who presents today 6 weeks out from laparoscopic cholecystectomy doing pretty well. She has observed her entire 6 week postop course of activity restriction and physical exam does not reveal any hernias or areas of concern. 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 I have encouraged her to resume exercise and activity and I will also send her for a PT evaluation to determine her abilities at this time with the goal of returning to baseline. Objective:  Vitals:    17 1421   BP: 100/62   Pulse: 64   Resp: 18   Temp: 98 °F (36.7 °C)   Weight: 68 kg (150 lb)        Physical Exam:   General:  Well developed well nourished female in no apparent distress   Abdomen: abdomen is soft with no tenderness. No masses, organomegaly or   Guarding. Incisions are all well-healed. .      Labs:  No results found for this or any previous visit (from the past 24 hour(s)). Current Medications:  Current Outpatient Prescriptions   Medication Sig Dispense Refill    topiramate (TOPAMAX) 50 mg tablet Take 100 mg by mouth daily.  fluticasone-salmeterol (ADVAIR DISKUS) 250-50 mcg/dose diskus inhaler Take 1 Puff by inhalation daily.  loratadine (CLARITIN) 10 mg tablet Take 10 mg by mouth.  albuterol (PROVENTIL VENTOLIN) 2.5 mg /3 mL (0.083 %) nebulizer solution 3 mL by Nebulization route every four (4) hours as needed for Wheezing.  24 Each 0    OTHER Birth control pills---Lo lo estrin      SUMAtriptan (IMITREX) 100 mg tablet Take one tablet at HA onset may repeat > 2 hours if needed. Max 2 tabs in 24 hours 9 Tab 2    prenatal vit-calcium-iron-fa (PRENATAL PLUS, CALCIUM CARB,) 27 mg iron- 1 mg tab Take 1 Tab by mouth daily. 100 Tab 10    oxyCODONE-acetaminophen (PERCOCET) 5-325 mg per tablet Take 1 Tab by mouth every six (6) hours as needed for Pain. Max Daily Amount: 4 Tabs. 30 Tab 0       Chart and notes reviewed. Data reviewed. I have evaluated and examined the patient. IMPRESSION:   · Patient 6 week out from laparoscopic cholecystectomy here today doing very well. 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 PLAN:/DISCUSION:   · PT referral; evaluate and treat-return to baseline  · Follow-up following PT  · Work note provided     Ms. Milo Carter has a reminder for a \"due or due soon\" health maintenance. I have asked that she contact her primary care provider for follow-up on this health maintenance. Anya Jackson.  Beata Grullon, FNP-C

## 2017-12-19 NOTE — PATIENT INSTRUCTIONS
Weakness: Care Instructions  Your Care Instructions    Weakness is a lack of physical or muscle strength. You may feel that you need to make extra effort to move your arms, legs, or other muscles. Generalized weakness means that you feel weak in most areas of your body. Another type of weakness may affect just one muscle or group of muscles. You may feel weak and tired after you have done too much activity, such as taking an extra-long hike. This is not a serious problem. It often goes away on its own. Feeling weak can also be caused by medical conditions like thyroid problems, depression, or a virus. Sometimes the cause can be serious. Your doctor may want to do more tests to try to find the cause of the weakness. The doctor has checked you carefully, but problems can develop later. If you notice any problems or new symptoms, get medical treatment right away. Follow-up care is a key part of your treatment and safety. Be sure to make and go to all appointments, and call your doctor if you are having problems. It's also a good idea to know your test results and keep a list of the medicines you take. How can you care for yourself at home? · Rest when you feel tired. · Be safe with medicines. If your doctor prescribed medicine, take it exactly as prescribed. Call your doctor if you think you are having a problem with your medicine. You will get more details on the specific medicines your doctor prescribes. · Do not skip meals. Eating a balanced diet may increase your energy level. · Get some physical activity every day, but do not get too tired. When should you call for help? Call your doctor now or seek immediate medical care if:  ? · You have new or worse weakness. ? · You are dizzy or lightheaded, or you feel like you may faint. ? Watch closely for changes in your health, and be sure to contact your doctor if:  ? · You do not get better as expected. Where can you learn more?   Go to http://jadiel.info/. Enter 079 [id number] in the search box to learn more about \"Weakness: Care Instructions. \"  Current as of: March 20, 2017  Content Version: 11.4  © 7676-5545 [employer]. Care instructions adapted under license by [employer] (which disclaims liability or warranty for this information). If you have questions about a medical condition or this instruction, always ask your healthcare professional. [address] any warranty or liability for your use of this information.

## 2017-12-20 RX ORDER — TOPIRAMATE 100 MG/1
100 TABLET, FILM COATED ORAL
Qty: 90 TAB | Refills: 0 | Status: SHIPPED | OUTPATIENT
Start: 2017-12-20 | End: 2018-01-09 | Stop reason: SDUPTHER

## 2017-12-20 NOTE — TELEPHONE ENCOUNTER
Requested Prescriptions     Pending Prescriptions Disp Refills    topiramate (TOPAMAX) 50 mg tablet       Sig: Take 2 Tabs by mouth daily.      Refill request is for 90 day 100mg supply

## 2018-01-03 NOTE — TELEPHONE ENCOUNTER
Requested Prescriptions     Signed Prescriptions Disp Refills    topiramate (TOPAMAX) 100 mg tablet 90 Tab 0     Sig: Take 1 Tab by mouth nightly.      Authorizing Provider: Jing Eastman in basket for review and signature

## 2018-01-08 ENCOUNTER — HOSPITAL ENCOUNTER (OUTPATIENT)
Dept: PHYSICAL THERAPY | Age: 33
Discharge: HOME | End: 2018-01-08
Payer: COMMERCIAL

## 2018-01-08 PROCEDURE — 97112 NEUROMUSCULAR REEDUCATION: CPT

## 2018-01-08 PROCEDURE — 97161 PT EVAL LOW COMPLEX 20 MIN: CPT

## 2018-01-08 PROCEDURE — 97110 THERAPEUTIC EXERCISES: CPT

## 2018-01-08 NOTE — PROGRESS NOTES
PT DAILY TREATMENT NOTE     Patient Name: Cyrus Mcburney  Date:2018  : 1985  [x]  Patient  Verified  Payor: /    In time: 9:00  Out time: 9:50  Total Treatment Time (min): 50  Visit #: 5 of 8    Treatment Area: Muscle weakness (generalized) [M62.81]    SUBJECTIVE  Pain Level (0-10 scale): 10  Any medication changes, allergies to medications, adverse drug reactions, diagnosis change, or new procedure performed?: [x] No    [] Yes (see summary sheet for update)  Subjective functional status/changes:   [] No changes reported  The patient states that she wants to be able to return to work safe and wants to gradually strength. OBJECTIVE  25 min [x]Eval                  []Re-Eval     15 min Therapeutic Exercise:  [x] See flow sheet :   Rationale: increase ROM and increase strength to improve the patients ability to improve ADL ease. 10 min Neuromuscular Re-education:  [x]  See flow sheet :   Rationale: increase ROM and increase strength  to improve the patients ability to improve ADL ease. With   [] TE   [] TA   [] neuro   [] other: Patient Education: [x] Review HEP    [] Progressed/Changed HEP based on:   [] positioning   [] body mechanics   [] transfers   [] heat/ice application    [] other:      Other Objective/Functional Measures: See IE     Pain Level (0-10 scale) post treatment: 1/10    ASSESSMENT/Changes in Function: See POC. Patient will continue to benefit from skilled PT services to modify and progress therapeutic interventions, address functional mobility deficits, address ROM deficits, address strength deficits, analyze and address soft tissue restrictions, analyze and cue movement patterns, analyze and modify body mechanics/ergonomics, assess and modify postural abnormalities and instruct in home and community integration to attain remaining goals.      [x]  See Plan of Care  []  See progress note/recertification  []  See Discharge Summary         Progress towards goals / Updated goals:  Short Term Goals: To be accomplished in 2 weeks:                         1. The patient will be independent and compliant with HEP to maximize therapeutic benefit. 2. The patient will perform step downs void of pelvic drop to improve efficiency of stair negotiation during response. Long Term Goals: To be accomplished in 4 weeks:                         1. The patient will display farmer's carry of 40# 120' in order to return to PLOF. 2. The patient will display lateral plank to 15\" void of pelvic drop to improve stability during job related tasks. 3. The patient will demonstrate plyometric ball lateral tosses with good stability for rotary challenge of core during dynamic job tasks.                                         PLAN  []  Upgrade activities as tolerated     [x]  Continue plan of care  []  Update interventions per flow sheet       []  Discharge due to:_  []  Other:_      Garret Combs PT 1/8/2018  10:09 AM    Future Appointments  Date Time Provider Satinder Beltran   1/11/2018 3:00 PM [address] [place] [address]   1/17/2018 10:00 AM Garret Combs PT [facility]   1/19/2018 10:00 [facility]   1/22/2018 9:30 AM [facility]   1/24/2018 9:30 AM [facility]   1/25/2018 9:15 AM Brian Cagle  E [facility]

## 2018-01-08 NOTE — PROGRESS NOTES
[facility]  [address]  [place], [address]  [phone] fax    Plan of Care/ Statement of Necessity for Physical Therapy Services    Patient name: Gil Cook Start of Care: 2018   Referral source: Tonia Boateng NP : 1985    Medical Diagnosis: Muscle weakness (generalized) [M62.81]   Onset Date:2017    Treatment Diagnosis: S/p cholecystectomy   Prior Hospitalization: see medical history Provider#: [id number]   Medications: Verified on Patient summary List    Comorbidities: Latex Allergy, Cholecystectomy, L wrist pins/removal   Prior Level of Function: The patient states that she was unlimited with regards to functional tasks prior to onset. The Plan of Care and following information is based on the information from the initial evaluation. Assessment/ key information: The patient is a 34year old female s/p cholecystectomy performed on 2017. 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 The patient does state she has returned to running about 3 miles, though she has been sore after this. The patient presents with impairments consisting of decreased strength, limited core stability, and decreased lifting ease. She will benefit from skilled PT in order to address the above impairments with intent to return to gradual loading of core with progression to full duty unlimited.      Evaluation Complexity History MEDIUM  Complexity : 1-2 comorbidities / personal factors will impact the outcome/ POC ; Examination MEDIUM Complexity : 3 Standardized tests and measures addressing body structure, function, activity limitation and / or participation in recreation  ;Presentation LOW Complexity : Stable, uncomplicated  ;Clinical Decision Making LOW Complexity : FOTO score of   Overall Complexity Rating: LOW   Problem List: decrease strength, decrease ADL/ functional abilitiies and decrease activity tolerance   Treatment Plan may include any combination of the following: Therapeutic exercise, Therapeutic activities, Neuromuscular re-education, Physical agent/modality, Manual therapy, Patient education and Functional mobility training  Patient / Family readiness to learn indicated by: asking questions, trying to perform skills and interest  Persons(s) to be included in education: patient (P)  Barriers to Learning/Limitations: None   Patient Goal (s): Return to [facility]  Patient Self Reported Health Status: fair  Rehabilitation Potential: excellent    Short Term Goals: To be accomplished in 2 weeks:   1. The patient will be independent and compliant with HEP to maximize therapeutic benefit. 2. The patient will perform step downs void of pelvic drop to improve efficiency of stair negotiation during response. Long Term Goals: To be accomplished in 4 weeks:   1. The patient will display farmer's carry of 40# 120' in order to return to PLOF. 2. The patient will display lateral plank to 15\" void of pelvic drop to improve stability during job related tasks. 3. The patient will demonstrate plyometric ball lateral tosses with good stability for rotary challenge of core during dynamic job tasks. Frequency / Duration: Patient to be seen 2 times per week for 3-4 weeks. Patient/ Caregiver education and instruction: Diagnosis, prognosis, self care, activity modification and exercises   [x]  Plan of care has been reviewed with JACOB Ortiz, PT 1/8/2018 10:00 AM    ________________________________________________________________________    I certify that the above Therapy Services are being furnished while the patient is under my care. I agree with the treatment plan and certify that this therapy is necessary.     Physician's Signature:____________________ Date:____________Time: _________    Please sign and return to [facility] [id number]  [place], [facility] [address]  [phone] fax

## 2018-01-09 ENCOUNTER — DOCUMENTATION ONLY (OUTPATIENT)
Dept: NEUROLOGY | Age: 33
End: 2018-01-09

## 2018-01-09 NOTE — TELEPHONE ENCOUNTER
Patient returned call to ofc. Message from Rosita Serrano was given to patient. Pt confirmed 90-day supply of Topamax to be sent to [facility].

## 2018-01-09 NOTE — PROGRESS NOTES
Left a message for patient to call back. Need to clarify if patient is requesting a 90 day supply of Topamax to be sent to [facility].

## 2018-01-10 RX ORDER — TOPIRAMATE 100 MG/1
100 TABLET, FILM COATED ORAL
Qty: 90 TAB | Refills: 0 | Status: SHIPPED | OUTPATIENT
Start: 2018-01-10 | End: 2018-04-25 | Stop reason: SDUPTHER

## 2018-01-11 ENCOUNTER — HOSPITAL ENCOUNTER (OUTPATIENT)
Dept: PHYSICAL THERAPY | Age: 33
Discharge: HOME | End: 2018-01-11
Payer: COMMERCIAL

## 2018-01-11 PROCEDURE — 97110 THERAPEUTIC EXERCISES: CPT

## 2018-01-11 PROCEDURE — 97530 THERAPEUTIC ACTIVITIES: CPT

## 2018-01-11 NOTE — PROGRESS NOTES
PT DAILY TREATMENT NOTE 12-    Patient Name: Simone Phillips  Date:2018  : 1985  [x]  Patient  Verified  Payor: NADIRA [place] / Plan: [address] [place] [address] / Product Type: PPO /    In time:3:00pm  Out time:3:34pm  Total Treatment Time (min): 34  Visit #: 2 of 8    Treatment Area: Muscle weakness (generalized) [M62.81]    SUBJECTIVE  Pain Level (0-10 scale): 0  Any medication changes, allergies to medications, adverse drug reactions, diagnosis change, or new procedure performed?: [x] No    [] Yes (see summary sheet for update)  Subjective functional status/changes:   [] No changes reported  Pt reports some fatigue and intermittent SOB 2/2 \"having a cold\" for 4 days. OBJECTIVE    24 min Therapeutic Exercise:  [x] See flow sheet :   Rationale: increase strength, improve coordination and activity tolerance to improve the patients ability to facilitate return to work. 10 min Therapeutic Activity:  [x]  See flow sheet :   Rationale: increase strength, improve coordination and increase proprioception  to improve the patients ability to improve return to work performance. With   [] TE   [] TA   [] neuro   [] other: Patient Education: [x] Review HEP    [] Progressed/Changed HEP based on:   [] positioning   [] body mechanics   [] transfers   [] heat/ice application    [] other:      Other Objective/Functional Measures:    Pt reports some abdominal discomfort with R side planks  Minimal instability with eccentric step downs     Pain Level (0-10 scale) post treatment: 1    ASSESSMENT/Changes in Function: Pt reports some abdominal discomfort with side planks, otherwise is more limited by SOB 2/2 \"cold\" and \"asthma\" today. Will progress further into exercise NV as tolerated.     Patient will continue to benefit from skilled PT services to modify and progress therapeutic interventions, address functional mobility deficits, address ROM deficits, address strength deficits, analyze and address soft tissue restrictions, analyze and cue movement patterns, analyze and modify body mechanics/ergonomics and instruct in home and community integration to attain remaining goals. []  See Plan of Care  []  See progress note/recertification  []  See Discharge Summary         Progress towards goals / Updated goals:  Short Term Goals: To be accomplished in 2 weeks:                         1. The patient will be independent and compliant with HEP to maximize therapeutic benefit. 2. The patient will perform step downs void of pelvic drop to improve efficiency of stair negotiation during response. Long Term Goals: To be accomplished in 4 weeks:                         1. The patient will display farmer's carry of 40# 120' in order to return to PLOF. 2. The patient will display lateral plank to 15\" void of pelvic drop to improve stability during job related tasks. 3. The patient will demonstrate plyometric ball lateral tosses with good stability for rotary challenge of core during dynamic job tasks.          PLAN  [x]  Upgrade activities as tolerated     [x]  Continue plan of care  []  Update interventions per flow sheet       []  Discharge due to:_  []  Other:_      Ashley Gupta PT, DPT, ATC 1/11/2018  3:10 PM    Future Appointments  Date Time Provider Satinder Longoriai   1/17/2018 10:00 AM [facility]   1/19/2018 10:00 AM Richard Silver PTA [facility]   1/22/2018 9:30 AM Richard Silver PTA [facility]   1/24/2018 9:30 AM Nikita Harris PT [facility]   1/25/2018 9:15 AM Denny Bass NP [facility]

## 2018-01-17 ENCOUNTER — HOSPITAL ENCOUNTER (OUTPATIENT)
Dept: PHYSICAL THERAPY | Age: 33
Discharge: HOME | End: 2018-01-17
Payer: COMMERCIAL

## 2018-01-17 PROCEDURE — 97110 THERAPEUTIC EXERCISES: CPT

## 2018-01-17 PROCEDURE — 97112 NEUROMUSCULAR REEDUCATION: CPT

## 2018-01-17 NOTE — PROGRESS NOTES
PT DAILY TREATMENT NOTE     Patient Name: Vikram Carter  Date:2018  : 1985  [x]  Patient  Verified  Payor: BLUE CROSS / Plan: [address] [place] [address] / Product Type: PPO /    In time:10:00  Out time:10:40  Total Treatment Time (min): 40  Visit #: 3 of 8    Treatment Area: Muscle weakness (generalized) [M62.81]    SUBJECTIVE  Pain Level (0-10 scale): 0/10  Any medication changes, allergies to medications, adverse drug reactions, diagnosis change, or new procedure performed?: [x] No    [] Yes (see summary sheet for update)  Subjective functional status/changes:   [] No changes reported  The patient states that her cold is feeling better. OBJECTIVE  28 min Therapeutic Exercise:  [x] See flow sheet :   Rationale: increase ROM and increase strength to improve the patients ability to improve ADL ease. 12 min Neuromuscular Re-education:  [x]  See flow sheet : Rope waves box lift carries, farmer's carry. Rationale: increase ROM and increase strength  to improve the patients ability to improve ADL ease. With   [] TE   [] TA   [] neuro   [] other: Patient Education: [x] Review HEP    [] Progressed/Changed HEP based on:   [] positioning   [] body mechanics   [] transfers   [] heat/ice application    [] other:      Other Objective/Functional Measures:   Progressing with strengthening and core stability. Fatigued quickly with core recruitment. Pain Level (0-10 scale) post treatment: 1/10    ASSESSMENT/Changes in Function: The patient is progressing with regards to core stability. Performed box lifts and farmer's carry void of discomfort and with good form. Progress to increased ballistic and dynamic core recruitment.     Patient will continue to benefit from skilled PT services to modify and progress therapeutic interventions, address functional mobility deficits, address ROM deficits, address strength deficits, analyze and address soft tissue restrictions, analyze and cue movement patterns, analyze and modify body mechanics/ergonomics, assess and modify postural abnormalities and instruct in home and community integration to attain remaining goals. []  See Plan of Care  []  See progress note/recertification  []  See Discharge Summary         Progress towards goals / Updated goals:  Short Term Goals: To be accomplished in 2 weeks:                         8. The patient will be independent and compliant with HEP to maximize therapeutic benefit. The patient reports compliance 1/17/2018                         2. The patient will perform step downs void of pelvic drop to improve efficiency of stair negotiation during response. Long Term Goals: To be accomplished in 4 weeks:                         1. The patient will display farmer's carry of 40# 120' in order to return to PLOF.                        3. The patient will display lateral plank to 15\" void of pelvic drop to improve stability during job related tasks.                         3.  The patient will demonstrate plyometric ball lateral tosses with good stability for rotary challenge of core during dynamic job tasks.         PLAN  []  Upgrade activities as tolerated     [x]  Continue plan of care  []  Update interventions per flow sheet       []  Discharge due to:_  []  Other:_      Resa Schwab, PT 1/17/2018  11:53 AM    Future Appointments  Date Time Provider Satinder Beltran   1/19/2018 10:00 AM Jo Ann Russell PTA [facility]   1/22/2018 9:30 AM Jo Ann Russell PTA [facility]   1/24/2018 9:30 AM Resa Schwab, PT [facility]   1/25/2018 9:15 AM Milagros Seymour, [facility] [address]

## 2018-01-19 ENCOUNTER — HOSPITAL ENCOUNTER (OUTPATIENT)
Dept: PHYSICAL THERAPY | Age: 33
Discharge: HOME | End: 2018-01-19
Payer: COMMERCIAL

## 2018-01-19 PROCEDURE — 97110 THERAPEUTIC EXERCISES: CPT

## 2018-01-19 PROCEDURE — 97112 NEUROMUSCULAR REEDUCATION: CPT

## 2018-01-19 NOTE — PROGRESS NOTES
PT DAILY TREATMENT NOTE     Patient Name: Jose Askew  Date:2018  : 1985  [x]  Patient  Verified  Payor: NADIRA [place] / Plan: [address] [place] [address] / Product Type: PPO /    In time:10:01  Out time:11:02  Total Treatment Time (min): 61  Visit #: 4 of 8    Treatment Area: Muscle weakness (generalized) [M62.81]    SUBJECTIVE  Pain Level (0-10 scale): 1/10  Any medication changes, allergies to medications, adverse drug reactions, diagnosis change, or new procedure performed?: [x] No    [] Yes (see summary sheet for update)  Subjective functional status/changes:   [] No changes reported  \"Little sore on my side. \"    OBJECTIVE    36 min Therapeutic Exercise:  [x] See flow sheet :   Rationale: increase ROM and increase strength to improve the patients ability to perform ADL's.     25 min Neuromuscular Re-education:  [x]  See flow sheet :   Rationale: increase strength and increase proprioception  to improve the patients ability to perform functional activities. With   [x] TE   [] TA   [] neuro   [] other: Patient Education: [x] Review HEP    [] Progressed/Changed HEP based on:   [] positioning   [] body mechanics   [] transfers   [] heat/ice application    [] other:      Other Objective/Functional Measures: Maintains neutral spine with quadruped reformer however has mild trunk lean with LE extensions. Pain Level (0-10 scale) post treatment: 1.5/10    ASSESSMENT/Changes in Function: Slight pelvic drop with eccentric step downs. Patient will continue to benefit from skilled PT services to modify and progress therapeutic interventions, address functional mobility deficits, address ROM deficits, address strength deficits, analyze and cue movement patterns and analyze and modify body mechanics/ergonomics to attain remaining goals.      [x]  See Plan of Care  []  See progress note/recertification  []  See Discharge Summary         Progress towards goals / Updated goals:  Short Term Goals: To be accomplished in 2 weeks:                          1. The patient will be independent and compliant with HEP to maximize therapeutic benefit. The patient reports compliance 1/17/2018                         2. The patient will perform step downs void of pelvic drop to improve efficiency of stair negotiation during response. - Slight pelvic drop with eccentric step downs. 1/19/2018  Long Term Goals: To be accomplished in 4 weeks:                         5. The patient will display farmer's carry of 40# 120' in order to return to PLOF.                        6. The patient will display lateral plank to 15\" void of pelvic drop to improve stability during job related tasks.                         3.  The patient will demonstrate plyometric ball lateral tosses with good stability for rotary challenge of core during dynamic job tasks.        PLAN  []  Upgrade activities as tolerated     [x]  Continue plan of care  []  Update interventions per flow sheet       []  Discharge due to:_  []  Other:_      Brock Ahuja PTA 1/19/2018  10:13 AM    Future Appointments  Date Time Provider Satinder Longoriai   1/22/2018 9:30 AM [facility]   [facility]/24/2018 9:30 [facility]   1/25/2018 9:15 AM Kristian Powell NP [facility]   1/26/2018 9:15 AM Jeremy Truong NP [address] [place] [address]

## 2018-01-22 ENCOUNTER — HOSPITAL ENCOUNTER (OUTPATIENT)
Dept: PHYSICAL THERAPY | Age: 33
Discharge: HOME | End: 2018-01-22
Payer: COMMERCIAL

## 2018-01-22 PROCEDURE — 97110 THERAPEUTIC EXERCISES: CPT

## 2018-01-22 PROCEDURE — 97112 NEUROMUSCULAR REEDUCATION: CPT

## 2018-01-22 NOTE — PROGRESS NOTES
PT DAILY TREATMENT NOTE 12    Patient Name: Puneet Grossman  Date:2018  : 1985  [x]  Patient  Verified  Payor: BLUE CROSS / Plan: [address] [place] [address] / Product Type: PPO /    In time:9:30  Out time:10:39  Total Treatment Time (min): 69  Visit #: 5 of 8    Treatment Area: Muscle weakness (generalized) [M62.81]    SUBJECTIVE  Pain Level (0-10 scale): 0/10  Any medication changes, allergies to medications, adverse drug reactions, diagnosis change, or new procedure performed?: [x] No    [] Yes (see summary sheet for update)  Subjective functional status/changes:   [] No changes reported  \"Doing okay, feel out of shape. \"    OBJECTIVE     44 min Therapeutic Exercise:  [x] See flow sheet :   Rationale: increase ROM and increase strength to improve the patients ability to perform ADL's.    25 min Neuromuscular Re-education:  [x]  See flow sheet :   Rationale: increase strength and increase proprioception  to improve the patients ability to perform functional activities. With   [x] TE   [] TA   [] neuro   [] other: Patient Education: [x] Review HEP    [] Progressed/Changed HEP based on:   [] positioning   [] body mechanics   [] transfers   [] heat/ice application    [] other:      Other Objective/Functional Measures: Increased box lift/carry to 40# for 80'x2. Added 12# lateral wall throws 10 reps each. Pain Level (0-10 scale) post treatment: 0/10    ASSESSMENT/Changes in Function: Performs lateral plank modified as unable to maintain full plank for 30\". (R) sided stomach discomfort/pain with lateral planks and lateral wall throws. Patient will continue to benefit from skilled PT services to modify and progress therapeutic interventions, address functional mobility deficits, address ROM deficits, address strength deficits, analyze and cue movement patterns and analyze and modify body mechanics/ergonomics to attain remaining goals.      [x]  See Plan of Care  []  See progress note/recertification  []  See Discharge Summary         Progress towards goals / Updated goals:  Short Term Goals: To be accomplished in 2 weeks:                          7. The patient will be independent and compliant with HEP to maximize therapeutic benefit. The patient reports compliance 1/17/2018                         2. The patient will perform step downs void of pelvic drop to improve efficiency of stair negotiation during response. - Slight pelvic drop with eccentric step downs. 1/19/2018  Long Term Goals: To be accomplished in 4 weeks:                         4. The patient will display farmer's carry of 40# 120' in order to return to PLOF.                        2. The patient will display lateral plank to 15\" void of pelvic drop to improve stability during job related tasks. - Performs lateral plank modified as unable to maintain full plank for 30\". 1/22/2018                         3.  The patient will demonstrate plyometric ball lateral tosses with good stability for rotary challenge of core during dynamic job tasks.         PLAN  []  Upgrade activities as tolerated     [x]  Continue plan of care  []  Update interventions per flow sheet       []  Discharge due to:_  []  Other:_      Rhianna Cotton PTA 1/22/2018  9:45 AM    Future Appointments  Date Time Provider Satinder Beltran   1/24/2018 9:30 AM Kimber Bueno PT [facility]   1/25/2018 9:15 AM Dalton Wynn NP [facility] SCH[facility]   1/26/2018 9:15 AM Aidan De Anda NP [address] [place] [address]

## 2018-01-24 ENCOUNTER — HOSPITAL ENCOUNTER (OUTPATIENT)
Dept: PHYSICAL THERAPY | Age: 33
Discharge: HOME | End: 2018-01-24
Payer: COMMERCIAL

## 2018-01-24 PROCEDURE — 97110 THERAPEUTIC EXERCISES: CPT

## 2018-01-24 PROCEDURE — 97112 NEUROMUSCULAR REEDUCATION: CPT

## 2018-01-24 NOTE — PROGRESS NOTES
[facility]  [address]  [place], [address]  [phone] fax    Physical Therapy Discharge Summary  Patient name: Missael Jason Start of Care: 2018   Referral source: Sharon Grey NP : 1985                          Medical Diagnosis: Muscle weakness (generalized) [M62.81] Onset Date:2017                          Treatment Diagnosis: S/p cholecystectomy   Prior Hospitalization: see medical history Provider#: [id number]   Medications: Verified on Patient summary List    Comorbidities: Latex Allergy, Cholecystectomy, L wrist pins/removal   Prior Level of Function: The patient states that she was unlimited with regards to functional tasks prior to onset.    Visits from Start of Care: 6    Missed Visits: 0  Reporting Period : 2018 to 2018    Summary of Care:  Short Term Goals: To be accomplished in 2 weeks:                          1. The patient will be independent and compliant with HEP to maximize therapeutic benefit. The patient reports compliance 2018                         2. The patient will perform step downs void of pelvic drop to improve efficiency of stair negotiation during response.  - Slight pelvic drop with eccentric step downs. 2018  Long Term Goals: To be accomplished in 4 weeks:                         5. The patient will display farmer's carry of 40# 120' in order to return to PLOF. Met 50# 2018                         2. The patient will display lateral plank to 15\" void of pelvic drop to improve stability during job related tasks. - Performs lateral plank modified as unable to maintain full plank for 30\". 2018                         3.  The patient will demonstrate plyometric ball lateral tosses with good stability for rotary challenge of core during dynamic job tasks.     Met - 2018    ASSESSMENT/RECOMMENDATIONS: The patient performed lifts and carries void of pain including higher level rope waves. D/C to transition to HEP with clearance for work recommended.     [x]Discontinue therapy: [x]Patient has reached or is progressing toward set goals      []Patient is non-compliant or has abdicated      []Due to lack of appreciable progress towards set [address], PT 1/24/2018 10:54 AM

## 2018-01-24 NOTE — PROGRESS NOTES
PT DAILY TREATMENT NOTE     Patient Name: Missael Jason  Date:2018  : 1985  [x]  Patient  Verified  Payor: BLUE CROSS / Plan: [address] [place] [address] / Product Type: PPO /    In time:9:31  Out time:10:16  Total Treatment Time (min): 45  Visit #: 6 of 8    Treatment Area: Muscle weakness (generalized) [M62.81]    SUBJECTIVE  Pain Level (0-10 scale): 0/10  Any medication changes, allergies to medications, adverse drug reactions, diagnosis change, or new procedure performed?: [x] No    [] Yes (see summary sheet for update)  Subjective functional status/changes:   [] No changes reported  The patient states that she is void of pain upon arrival.    OBJECTIVE  35 min Therapeutic Exercise:  [x] See flow sheet :   Rationale: increase ROM and increase strength to improve the patients ability to improve ADL ease. 10 min Neuromuscular Re-education:  [x]  See flow sheet :    Rationale: increase ROM and increase strength  to improve the patients ability to improve ADL ease. With   [] TE   [] TA   [] neuro   [] other: Patient Education: [x] Review HEP    [] Progressed/Changed HEP based on:   [] positioning   [] body mechanics   [] transfers   [] heat/ice application    [] other:      Other Objective/Functional Measures:   Plyometric ball toss void of pain 8#,  Carry lifts performed 50# up and down stairs. Ropes performed void of pain     Pain Level (0-10 scale) post treatment: 0/10    ASSESSMENT/Changes in Function: The patient performed lifts and carries void of pain including higher level rope waves. D/C to transition to HEP with clearance for work recommended.     Patient will continue to benefit from skilled PT services to modify and progress therapeutic interventions, address functional mobility deficits, address ROM deficits, address strength deficits, analyze and address soft tissue restrictions, analyze and cue movement patterns, analyze and modify body mechanics/ergonomics, assess and modify postural abnormalities and instruct in home and community integration to attain remaining goals. []  See Plan of Care  []  See progress note/recertification  []  See Discharge Summary         Progress towards goals / Updated goals:  Short Term Goals: To be accomplished in 2 weeks:                          4. The patient will be independent and compliant with HEP to maximize therapeutic benefit. The patient reports compliance 1/17/2018                         2. The patient will perform step downs void of pelvic drop to improve efficiency of stair negotiation during response.  - Slight pelvic drop with eccentric step downs. 1/19/2018  Long Term Goals: To be accomplished in 4 weeks:                         9. The patient will display farmer's carry of 40# 120' in order to return to PLOF. Met 50# 1/24/2018                         2. The patient will display lateral plank to 15\" void of pelvic drop to improve stability during job related tasks. - Performs lateral plank modified as unable to maintain full plank for 30\". 1/22/2018                         3.  The patient will demonstrate plyometric ball lateral tosses with good stability for rotary challenge of core during dynamic job tasks.     Met - 1/24/2018    PLAN  []  Upgrade activities as tolerated     [x]  Continue plan of care  []  Update interventions per flow sheet       []  Discharge due to:_  []  Other:_      Musa Hayes, PT 1/24/2018  10:49 AM    Future Appointments  Date Time Provider Satinder Beltran   1/25/2018 9:15 AM Juno Dobbs  [facility]   1/26/2018 9:15 AM Joe Garland  [address]

## 2018-01-25 ENCOUNTER — OFFICE VISIT (OUTPATIENT)
Dept: NEUROLOGY | Age: 33
End: 2018-01-25

## 2018-01-25 VITALS
HEART RATE: 86 BPM | OXYGEN SATURATION: 98 % | SYSTOLIC BLOOD PRESSURE: 100 MMHG | TEMPERATURE: 97.1 F | HEIGHT: 63 IN | WEIGHT: 154.8 LBS | RESPIRATION RATE: 16 BRPM | BODY MASS INDEX: 27.43 KG/M2 | DIASTOLIC BLOOD PRESSURE: 60 MMHG

## 2018-01-25 DIAGNOSIS — C71.1 GLIOMA OF FRONTAL LOBE (HCC): ICD-10-CM

## 2018-01-25 DIAGNOSIS — G43.011 INTRACTABLE MIGRAINE WITHOUT AURA AND WITH STATUS MIGRAINOSUS: Primary | ICD-10-CM

## 2018-01-25 RX ORDER — SUMATRIPTAN AND NAPROXEN SODIUM 85; 500 MG/1; MG/1
TABLET, FILM COATED ORAL
Qty: 9 TAB | Refills: 2 | Status: SHIPPED | OUTPATIENT
Start: 2018-01-25 | End: 2018-04-25 | Stop reason: SDUPTHER

## 2018-01-25 NOTE — MR AVS SNAPSHOT
[address] [facility] [address] 
 
 
 [address] [facility] [address] [phone] Patient: Serafin Harris MRN: [id number] :1985 Visit Information Date & Time Provider Department Dept. Phone Encounter #  
 2018  9:15 AM Giselle Capone NP [facility] [phone] [id number] Follow-up Instructions Return in about 3 months (around 2018). Your Appointments 2018  9:15 AM  
POST OP with Mike Gerard NP [address] [facility] (LEN Man) Appt Note: Gallbladder sx/2017  
 [address] [facility] 2e [facility] [address] [phone]  
  
   
 [address] [facility] [address] [facility] [address] Upcoming Health Maintenance Date Due Pneumococcal 19-64 Medium Risk (1 of 1 - PPSV23) 2003 Influenza Age 5 to Adult 2017 PAP AKA CERVICAL CYTOLOGY 11/10/2017 DTaP/Tdap/Td series (2 - Td) 2024 Allergies as of 2018  Review Complete On: 2018 By: Gareth Espinoza LPN Severity Noted Reaction Type Reactions Latex  2014    Contact Dermatitis Dilantin [Phenytoin Sodium Extended] High 2013   Systemic Anaphylaxis Phenobarbital High 2013   Systemic Anaphylaxis Current Immunizations  Never Reviewed Name Date Tdap 2014 12:04 PM  
  
 Not reviewed this visit Vitals BP Pulse Temp Resp Height(growth percentile) Weight(growth percentile) 100/60 86 97.1 °F (36.2 °C) (Temporal) 16 5' 3\" (1.6 m) 154 lb 12.8 oz (70.2 kg) SpO2 BMI OB Status Smoking Status 98% 27.42 kg/m2 Unknown Never Smoker BMI and BSA Data Body Mass Index Body Surface Area  
 27.42 kg/m 2 1.77 m 2 Preferred Pharmacy Pharmacy Name Phone [address], [address] 2771 [facility] [phone] Your Updated Medication List  
  
 This list is accurate as of: 1/25/18  9:57 AM.  Always use your most recent med list.  
  
  
  
  
 ADVAIR DISKUS 250-50 mcg/dose diskus inhaler Generic drug:  fluticasone-salmeterol Take 1 Puff by inhalation daily. albuterol 2.5 mg /3 mL (0.083 %) nebulizer solution Commonly known as:  PROVENTIL VENTOLIN  
3 mL by Nebulization route every four (4) hours as needed for Wheezing. CLARITIN 10 mg tablet Generic drug:  loratadine Take 10 mg by mouth. OTHER Birth control pills---Lo lo estrin  
  
 prenatal vit-calcium-iron-fa 27 mg iron- 1 mg Tab Commonly known as:  PRENATAL PLUS (CALCIUM CARB) Take 1 Tab by mouth daily. SUMAtriptan-naproxen  mg tablet Commonly known as:  TREXIMET Take one tablet at HA onset, may repeat >2 hr x 1. Max 2 tabs in 24 hours  
  
 topiramate 100 mg tablet Commonly known as:  TOPAMAX Take 1 Tab by mouth nightly. Prescriptions Sent to Pharmacy Refills SUMAtriptan-naproxen (TREXIMET)  mg tablet 2 Sig: Take one tablet at HA onset, may repeat >2 hr x 1. Max 2 tabs in 24 hours Class: Normal  
 Pharmacy: [id number], [address] [facility] or [facility] #: [phone] Follow-up Instructions Return in about 3 months (around 4/25/2018). Patient Instructions I have prescribed Treximet take at headache onset. Stop Imitrex. Continue Topamax. Introducing \[facility] [place] [facility]\"" & HEALTH SERVICES! Dear Jacquelin Martin: Thank you for requesting a Sparkcentral account. Our records indicate that you already have an active Sparkcentral account. You can access your account anytime at [email or web address] Did you know that you can access your hospital and ER discharge instructions at any time in Sparkcentral? You can also review all of your test results from your hospital stay or ER visit. Additional Information If you have questions, please visit the Frequently Asked Questions section of the Terraplay Systemshart website at [email or web address] Remember, MeeVee is NOT to be used for urgent needs. For medical emergencies, dial 911. Now available from your iPhone and Android! Please provide this summary of care documentation to your next provider. Your primary care clinician is listed as [phone]. If you have any questions after today's visit, please call [phone].

## 2018-01-25 NOTE — PROGRESS NOTES
[address], [address], [place], [address]  [facility] 177. [facility], [address]  Office:  [phone]  Fax: [phone]  Chief Complaint   Patient presents with    Neurologic Problem     f/u headaches. Patient states that she still gets one really bad headache per month that lasts approx 2-3 days. This is a 34year old female who presents for follow up appointment. Headache are improving with one headache a month that extends through several days. Can last up to three days. Denies focal deficits. She thinks she might not be taking the Imitrex soon enough. She has taken the Imitex once early onset headache, but denied improvement. She saw Dr. Yarelis Pascual in November for glioma and will follow up with him in 6 months. She endorses being in the hospital due to her asthma in the fall. She recently had her gallbladder removed. She says there has been a lot going on. She did follow-up with a psychiatrist and continues to seek counseling. Past Medical History:   Diagnosis Date    Asthma     Migraines     Neurological disorder     Seizures ([address]     none since age 7, no medications        Past Surgical History:   Procedure Laterality Date    HX ORTHOPAEDIC Left 2015    Ligament repair on wrist with hardware, later hardware removed    HX TONSILLECTOMY      HX WISDOM TEETH EXTRACTION  2002    LAP,CHOLECYSTECTOMY N/A 11/07/2017    Dr. Pastora Moreno       Current Outpatient Prescriptions   Medication Sig Dispense Refill    SUMAtriptan-naproxen (TREXIMET)  mg tablet Take one tablet at HA onset, may repeat >2 hr x 1. Max 2 tabs in 24 hours 9 Tab 2    topiramate (TOPAMAX) 100 mg tablet Take 1 Tab by mouth nightly. 90 Tab 0    fluticasone-salmeterol (ADVAIR DISKUS) 250-50 mcg/dose diskus inhaler Take 1 Puff by inhalation daily.  loratadine (CLARITIN) 10 mg tablet Take 10 mg by mouth.       albuterol (PROVENTIL VENTOLIN) 2.5 mg /3 mL (0.083 %) nebulizer solution 3 mL by Nebulization route every four (4) hours as needed for Wheezing. 24 Each 0    OTHER Birth control pills---Lo lo estrin      prenatal vit-calcium-iron-fa (PRENATAL PLUS, CALCIUM CARB,) 27 mg iron- 1 mg tab Take 1 Tab by mouth daily. 100 Tab 10        Allergies   Allergen Reactions    Latex Contact Dermatitis    Dilantin [Phenytoin Sodium Extended] Anaphylaxis    Phenobarbital Anaphylaxis       Social History   Substance Use Topics    Smoking status: Never Smoker    Smokeless tobacco: Never Used    Alcohol use Yes      Comment: socially       Family History   Problem Relation Age of Onset    Diabetes Mother     Diabetes Maternal Grandmother     Cancer Maternal Grandmother     Stroke Paternal Grandmother     Diabetes Maternal Aunt        Review of Systems  Pertinent positives and negatives as noted otherwise comprehensive review is negative. Examination  Visit Vitals    /60    Pulse 86    Temp 97.1 °F (36.2 °C) (Temporal)    Resp 16    Ht 5' 3\" (1.6 m)    Wt 70.2 kg (154 lb 12.8 oz)    SpO2 98%    BMI 27.42 kg/m2     Pleasant 34year old female, well appearing. No icterus. She is alert and oriented. She responds appropriately. She is able to discuss her medications and her medical history. No abnormal movements. No signs of ataxia or incoordination. Steady gait. Impression/Plan  Solo West is a 34 y.o. female whose history and physical are consistent with migraine. Patient endorses improvement in headache days. She says she has one bad headache a month that can last 2-3 days at a time. Patient did follow-up with Dr. David Taylor in November for glioma. She has repeat MRI and follow-up in 6 months. She continues to take Topamax 100 mg nightly. She is tolerating this well. She has Imitrex for abortive headache therapy, but says that she thinks she does not take this soon enough. Admits taking this on occasion, at early headache onset with no relief.   We will discontinue Imitrex and start Treximet as needed. Discussed medication, indication, side effects, and dosing. Patient verbalized understanding. Continue to track headaches and bring to next appointment. We will follow-up in 3 months. All questions addressed. Diagnoses and all orders for this visit:    1. Intractable migraine without aura and with status migrainosus    2. Glioma of frontal lobe ([facility] [address]    Other orders  -     SUMAtriptan-naproxen (TREXIMET)  mg tablet; Take one tablet at HA onset, may repeat >2 hr x 1. Max 2 tabs in 24 hours      Total time: 15 min   Counseling / coordination time: 12 min   > 50% counseling / coordination?: Yes re: symptoms, management, medications, indication, plan of care, and answering questions. Signed By: Cyndi Ernst NP    This note will not be viewable in [address]

## 2018-01-25 NOTE — PROGRESS NOTES
Chief Complaint   Patient presents with    Neurologic Problem     f/u headaches. Patient states that she still gets one really bad headache per month that lasts approx 2-3 days. 1. Have you been to the ER, urgent care clinic since your last visit? Hospitalized since your last visit? Yes Reason for visit: Asthma attack ([facility]), Asthma attack ([facility]), Gall Bladder Removal [facility])    2. Have you seen or consulted any other health care providers outside of the [address] [place] [address] since your last visit? Include any pap smears or colon screening. Yes Reason for visit: Counseling    Patient placed in room 6. Chart and medications reviewed with patient.

## 2018-01-26 ENCOUNTER — OFFICE VISIT (OUTPATIENT)
Dept: SURGERY | Age: 33
End: 2018-01-26

## 2018-01-26 VITALS — DIASTOLIC BLOOD PRESSURE: 70 MMHG | RESPIRATION RATE: 16 BRPM | TEMPERATURE: 97.7 F | SYSTOLIC BLOOD PRESSURE: 100 MMHG

## 2018-01-26 DIAGNOSIS — Z09 POSTOPERATIVE EXAMINATION: Primary | ICD-10-CM

## 2018-01-26 NOTE — PATIENT INSTRUCTIONS

## 2018-01-26 NOTE — PROGRESS NOTES
Audrey Tejeda. YOUNG Murphy  PROGRESS NOTE    Name: Vikram Carter MRN: [id number]   : 1985 [facility]   Date: 2018 Admission Date: No admission date for patient encounter. Subjective:  Ms. Sebastien Mcgrath is a very pleasant 35 yo white female who returns today following PT to return to baseline following laparoscopic cholecystectomy. She works as an EMT and was fearful at last visit that she would not be able to perform her physical requirements for her job. Her notes from PT demonstrate an adequate return to baseline and she feels she is doing much better today than she has been. She denies complications or concerns related to surgery. Objective:  Vitals:    18 0918   BP: 100/70   Resp: 16   Temp: 97.7 °F (36.5 °C)   TempSrc: Oral        Physical Exam:   General:  Well developed well nourished female in no apparent distress   Abdomen: abdomen is soft with no tenderness. No masses, organomegaly or guarding. Labs:  No results found for this or any previous visit (from the past 24 hour(s)). Current Medications:  Current Outpatient Prescriptions   Medication Sig Dispense Refill    SUMAtriptan-naproxen (TREXIMET)  mg tablet Take one tablet at HA onset, may repeat >2 hr x 1. Max 2 tabs in 24 hours 9 Tab 2    topiramate (TOPAMAX) 100 mg tablet Take 1 Tab by mouth nightly. 90 Tab 0    fluticasone-salmeterol (ADVAIR DISKUS) 250-50 mcg/dose diskus inhaler Take 1 Puff by inhalation daily.  loratadine (CLARITIN) 10 mg tablet Take 10 mg by mouth.  albuterol (PROVENTIL VENTOLIN) 2.5 mg /3 mL (0.083 %) nebulizer solution 3 mL by Nebulization route every four (4) hours as needed for Wheezing. 24 Each 0    OTHER Birth control pills---Lo lo estrin      prenatal vit-calcium-iron-fa (PRENATAL PLUS, CALCIUM CARB,) 27 mg iron- 1 mg tab Take 1 Tab by mouth daily. 100 Tab 10       Chart and notes reviewed. Data reviewed. I have evaluated and examined the patient. IMPRESSION:   · Patient ready to return to work following laparoscopic cholecystectomy in November. PLAN:/DISCUSION:   · Return to work note provided with no restrictions. · Follow up as needed. Ms. Nadia Peres has a reminder for a \"due or due soon\" health maintenance. I have asked that she contact her primary care provider for follow-up on this health maintenance. Nany Sanchez.  Lane Moya, OFELIAP-C

## 2018-01-26 NOTE — LETTER
NOTIFICATION RETURN TO WORK / SCHOOL 
 
1/26/2018 9:25 AM 
 
Ms. Puneet Grossman [address] [facility] [address] To Whom It May Concern: 
 
Puneet Grossman is currently under the care of [facility]. She will return to work on Monday January 29, 2018 under no work restrictions. If there are questions or concerns please have the patient contact our office. Sincerely, Loan Goncalves NP

## 2018-01-26 NOTE — MR AVS SNAPSHOT
[address] [facility] 
 
 
 [address] [facility] [address] [facility] [address] 
[phone] Patient: Mady Apley MRN: [id number] :1985 Visit Information Date & Time Provider Department Dept. Phone Encounter #  
 2018  9:15 AM [facility] [phone] Your Appointments 2018 10:15 AM  
Follow Up with Savita Mendoza  [facility] ([facility]) Appt Note: 3 mo f/u  
 [address] [facility] [address] [facility] [address] [phone]  
  
   
 [facility] [address] Upcoming Health Maintenance Date Due Pneumococcal 19-64 Medium Risk (1 of 1 - PPSV23) 2003 Influenza Age 5 to Adult 2017 PAP AKA CERVICAL CYTOLOGY 11/10/2017 DTaP/Tdap/Td series (2 - Td) 2024 Allergies as of 2018  Review Complete On: 2018 By: Farrukh Chew NP Severity Noted Reaction Type Reactions Latex  2014    Contact Dermatitis Dilantin [Phenytoin Sodium Extended] High 2013   Systemic Anaphylaxis Phenobarbital High 2013   Systemic Anaphylaxis Current Immunizations  Never Reviewed Name Date Tdap 2014 12:04 PM  
  
 Not reviewed this visit You Were Diagnosed With   
  
 Codes Comments Postoperative examination    -  Primary ICD-10-CM: N57 ICD-9-CM: V67.00 Vitals BP Temp Resp OB Status Smoking Status 100/70 97.7 °F (36.5 °C) (Oral) 16 Unknown Never Smoker Vitals History Preferred Pharmacy Pharmacy Name Phone [address], [address] 4[address] [facility] [phone] Your Updated Medication List  
  
   
This list is accurate as of: 18  9:47 AM.  Always use your most recent med list.  
  
  
  
  
 ADVAIR DISKUS 250-50 mcg/dose diskus inhaler Generic drug:  fluticasone-salmeterol Take 1 Puff by inhalation daily. albuterol 2.5 mg /3 mL (0.083 %) nebulizer solution Commonly known as:  PROVENTIL VENTOLIN  
3 mL by Nebulization route every four (4) hours as needed for Wheezing. CLARITIN 10 mg tablet Generic drug:  loratadine Take 10 mg by mouth. OTHER Birth control pills---Lo lo estrin  
  
 prenatal vit-calcium-iron-fa 27 mg iron- 1 mg Tab Commonly known as:  PRENATAL PLUS (CALCIUM CARB) Take 1 Tab by mouth daily. SUMAtriptan-naproxen  mg tablet Commonly known as:  TREXIMET Take one tablet at HA onset, may repeat >2 hr x 1. Max 2 tabs in 24 hours  
  
 topiramate 100 mg tablet Commonly known as:  TOPAMAX Take 1 Tab by mouth nightly. Introducing [facility] & HEALTH SERVICES! Dear [address] [place] [address]: Thank you for requesting a Zulama account. Our records indicate that you already have an active Zulama account. You can access your account anytime at [email or web address] Did you know that you can access your hospital and ER discharge instructions at any time in Zulama? You can also review all of your test results from your hospital stay or ER visit. Additional Information If you have questions, please visit the Frequently Asked Questions section of the Zulama website at [email or web address] Remember, Zulama is NOT to be used for urgent needs. For medical emergencies, dial 911. Now available from your iPhone and Android! Please provide this summary of care documentation to your next provider. Your primary care clinician is listed as [phone]. If you have any questions after today's visit, please call [phone].

## 2018-04-25 ENCOUNTER — OFFICE VISIT (OUTPATIENT)
Dept: NEUROLOGY | Age: 33
End: 2018-04-25

## 2018-04-25 VITALS
DIASTOLIC BLOOD PRESSURE: 70 MMHG | HEIGHT: 63 IN | HEART RATE: 83 BPM | WEIGHT: 152.8 LBS | OXYGEN SATURATION: 98 % | RESPIRATION RATE: 20 BRPM | SYSTOLIC BLOOD PRESSURE: 104 MMHG | TEMPERATURE: 97.1 F | BODY MASS INDEX: 27.07 KG/M2

## 2018-04-25 DIAGNOSIS — G43.019 INTRACTABLE MIGRAINE WITHOUT AURA AND WITHOUT STATUS MIGRAINOSUS: Primary | ICD-10-CM

## 2018-04-25 RX ORDER — TOPIRAMATE 100 MG/1
100 TABLET, FILM COATED ORAL
Qty: 90 TAB | Refills: 1 | Status: SHIPPED | OUTPATIENT
Start: 2018-04-25

## 2018-04-25 RX ORDER — MONTELUKAST SODIUM 10 MG/1
10 TABLET ORAL DAILY
COMMUNITY
End: 2018-07-25

## 2018-04-25 RX ORDER — SUMATRIPTAN AND NAPROXEN SODIUM 85; 500 MG/1; MG/1
TABLET, FILM COATED ORAL
Qty: 9 TAB | Refills: 5 | Status: SHIPPED | OUTPATIENT
Start: 2018-04-25

## 2018-04-25 NOTE — MR AVS SNAPSHOT
[address] [address] [phone] Patient: Benjamin Arana MRN: [id number] :1985 Visit Information Date & Time Provider Department Dept. Phone Encounter #  
 2018 10:15 AM Pati Dowell, [address] [facility] [place] [id number] Follow-up Instructions Return in about 6 months (around 10/25/2018). Upcoming Health Maintenance Date Due Pneumococcal 19-64 Medium Risk (1 of 1 - PPSV23) 2003 Influenza Age 5 to Adult 2017 PAP AKA CERVICAL CYTOLOGY 11/10/2017 DTaP/Tdap/Td series (2 - Td) 2024 Allergies as of 2018  Review Complete On: 2018 By: Ranjana Mi LPN Severity Noted Reaction Type Reactions Latex  2014    Contact Dermatitis Dilantin [Phenytoin Sodium Extended] High 2013   Systemic Anaphylaxis Phenobarbital High 2013   Systemic Anaphylaxis Current Immunizations  Never Reviewed Name Date Tdap 2014 12:04 PM  
  
 Not reviewed this visit Vitals BP Pulse Temp Resp Height(growth percentile) Weight(growth percentile) 104/70 (BP 1 Location: Left arm, BP Patient Position: Sitting) 83 97.1 °F (36.2 °C) (Oral) 20 5' 3\" (1.6 m) 152 lb 12.8 oz (69.3 kg) LMP SpO2 Breastfeeding? BMI OB Status Smoking Status 2018 (Within Days) 98% No 27.07 kg/m2 Unknown Never Smoker Vitals History BMI and BSA Data Body Mass Index Body Surface Area  
 27.07 kg/m 2 1.76 m 2 Preferred Pharmacy Pharmacy Name Phone [address], [address] 6583 [facility] [phone] Your Updated Medication List  
  
   
This list is accurate as of 18 10:48 AM.  Always use your most recent med list.  
  
  
  
  
 Geraldean Pyo 250-50 mcg/dose diskus inhaler Generic drug:  fluticasone-salmeterol Take 1 Puff by inhalation daily. albuterol 2.5 mg /3 mL (0.083 %) nebulizer solution Commonly known as:  PROVENTIL VENTOLIN  
3 mL by Nebulization route every four (4) hours as needed for Wheezing. CLARITIN 10 mg tablet Generic drug:  loratadine Take 10 mg by mouth. ONE-A-DAY WOMEN'S ACTIVE 18 mg iron- 400 mcg-180 mg Tab Generic drug:  mv,Ca,min-iron-FA-guarana-caff Take  by mouth. OTHER Birth control pills---Lo lo estrin  
  
 prenatal vit-calcium-iron-fa 27 mg iron- 1 mg Tab Commonly known as:  PRENATAL PLUS (CALCIUM CARB) Take 1 Tab by mouth daily. SINGULAIR 10 mg tablet Generic drug:  montelukast  
Take 10 mg by mouth daily. SUMAtriptan-naproxen  mg tablet Commonly known as:  TREXIMET Take one tablet at HA onset, may repeat >2 hr x 1. Max 2 tabs in 24 hours  
  
 topiramate 100 mg tablet Commonly known as:  TOPAMAX Take 1 Tab by mouth nightly. Prescriptions Sent to Pharmacy Refills  
 topiramate (TOPAMAX) 100 mg tablet 1 Sig: Take 1 Tab by mouth nightly. Class: Normal  
 Pharmacy: [address] [place] [address], 101 [facility] Ph #: [phone] Route: Oral  
 SUMAtriptan-naproxen (TREXIMET)  mg tablet 5 Sig: Take one tablet at HA onset, may repeat >2 hr x 1. Max 2 tabs in 24 hours Class: Normal  
 Pharmacy: [facility], [address] [facility] Ph #: [phone] Follow-up Instructions Return in about 6 months (around 10/25/2018). Patient Instructions I have refilled your Topamax and sent this to [facility]. Refill of Treximet sent to [facility]. Please come see me if you are moving to [place] so we can get you set with refills until you get situated. Please follow up with Dr. Pushpa Calderón regarding repeat MRI and follow up that is due for May. Introducing hospitals & HEALTH SERVICES!    
 Dear Suze Elizondo: 
 Thank you for requesting a Streamline account. Our records indicate that you already have an active Streamline account. You can access your account anytime at [email or web address] Did you know that you can access your hospital and ER discharge instructions at any time in Streamline? You can also review all of your test results from your hospital stay or ER visit. Additional Information If you have questions, please visit the Frequently Asked Questions section of the Streamline website at [email or web address] Remember, Streamline is NOT to be used for urgent needs. For medical emergencies, dial 911. Now available from your iPhone and Android! Please provide this summary of care documentation to your next provider. Your primary care clinician is listed as [address]. If you have any questions after today's visit, please call [phone].

## 2018-04-25 NOTE — PROGRESS NOTES
[facility]  [address], [address], [place], [address]  [address] [facility], [address]  Office:  [phone]  Fax: [phone]  Chief Complaint   Patient presents with    Migraine     follow-up     This is a 34year old female who presents for follow up migraine. She said headache are doing well. She is maintained on Topamax 100 mg nightly. Tolerating this. She has taken Treximet x1 with improvement in headache, although she said this made her sleepy. She is due to follow up with Dr. Rock Hadley in May. She said she is not sure if she has an appointment scheduled. She will call after leaving today. She mentions they may be coming to [place] in a few months. Past Medical History:   Diagnosis Date    Asthma     Migraines     Neurological disorder     Seizures ([facility] [place] [address]     none since age 7, no medications        Past Surgical History:   Procedure Laterality Date    HX ORTHOPAEDIC Left 2015    Ligament repair on wrist with hardware, later hardware removed    HX TONSILLECTOMY      HX WISDOM TEETH EXTRACTION  2002    LAP,CHOLECYSTECTOMY N/A 11/07/2017    Dr. Haritha Donohue       Current Outpatient Prescriptions   Medication Sig Dispense Refill    mv,Ca,min-iron-FA-guarana-caff (ONE-A-DAY WOMEN'S ACTIVE) 18 mg iron- 400 mcg-180 mg tab Take  by mouth.  montelukast (SINGULAIR) 10 mg tablet Take 10 mg by mouth daily.  topiramate (TOPAMAX) 100 mg tablet Take 1 Tab by mouth nightly. 90 Tab 1    SUMAtriptan-naproxen (TREXIMET)  mg tablet Take one tablet at HA onset, may repeat >2 hr x 1. Max 2 tabs in 24 hours 9 Tab 5    fluticasone-salmeterol (ADVAIR DISKUS) 250-50 mcg/dose diskus inhaler Take 1 Puff by inhalation daily.  loratadine (CLARITIN) 10 mg tablet Take 10 mg by mouth.  albuterol (PROVENTIL VENTOLIN) 2.5 mg /3 mL (0.083 %) nebulizer solution 3 mL by Nebulization route every four (4) hours as needed for Wheezing.  24 Each 0    OTHER Birth control pills---Lo lo estrin      prenatal vit-calcium-iron-fa (PRENATAL PLUS, CALCIUM CARB,) 27 mg iron- 1 mg tab Take 1 Tab by mouth daily. 100 Tab 10        Allergies   Allergen Reactions    Latex Contact Dermatitis    Dilantin [Phenytoin Sodium Extended] Anaphylaxis    Phenobarbital Anaphylaxis       Social History   Substance Use Topics    Smoking status: Never Smoker    Smokeless tobacco: Never Used    Alcohol use Yes      Comment: socially       Family History   Problem Relation Age of Onset    Diabetes Mother     Diabetes Maternal Grandmother     Cancer Maternal Grandmother     Stroke Paternal Grandmother     Diabetes Maternal Aunt        Review of Systems  Pertinent positives and negatives as noted otherwise comprehensive review is negative. Examination  Visit Vitals    /70 (BP 1 Location: Left arm, BP Patient Position: Sitting)    Pulse 83    Temp 97.1 °F (36.2 °C) (Oral)    Resp 20    Ht 5' 3\" (1.6 m)    Wt 69.3 kg (152 lb 12.8 oz)    LMP 04/23/2018 (Within Days)    SpO2 98%    Breastfeeding No    BMI 27.07 kg/m2     Pleasant 34year old female, well appearing. No icterus. She is alert and oriented. She is able to discuss her medications. No abnormal movements. No signs of incoordination or ataxia. Steady gait. Impression/Plan  Zaheer Zamora is a 34 y.o. female whose history and physical are consistent with migraine headaches. Headaches controlled. Refills provided for Topamax 100 mg nightly. Sent to [facility] per patient's request.  Refill of Treximet. She is provided number to Dr. Jenifer Boyle and will call to schedule follow up. Follow up in 6 months. If moving, please return before she does so we can set her up with refills until she finds a new provider in [address] [place] [address]. All questions addressed and patient is agreeable with plan of care. Diagnoses and all orders for this visit:    1.  Intractable migraine without aura and without status migrainosus    Other orders  - topiramate (TOPAMAX) 100 mg tablet; Take 1 Tab by mouth nightly. -     SUMAtriptan-naproxen (TREXIMET)  mg tablet; Take one tablet at HA onset, may repeat >2 hr x 1. Max 2 tabs in 24 hours    Total time: 15 min   Counseling / coordination time: 13 min   > 50% counseling / coordination?: Yes re: symptoms, management, plan of care, and answering questions. Signed By: Yoanna Darden NP    This note will not be viewable in [address]

## 2018-04-25 NOTE — PROGRESS NOTES
Gina Mann is a 34 y.o. female in today for follow-up on migraines. Learning assessment previously completed 10/27/2017; primary language is [place]. 1. Have you been to the ER, urgent care clinic since your last visit? Hospitalized since your last visit? Yes Reason for visit: [facility], April 2018, asthma    2. Have you seen or consulted any other health care providers outside of the [address] [place] [address] since your last visit? Include any pap smears or colon screening.  No

## 2018-04-25 NOTE — PATIENT INSTRUCTIONS
I have refilled your Topamax and sent this to [facility]. Refill of Treximet sent to [facility]. Please come see me if you are moving to [place] so we can get you set with refills until you get situated. Please follow up with Dr. Carmen Gleason regarding repeat MRI and follow up that is due for May.

## 2018-05-16 ENCOUNTER — HOSPITAL ENCOUNTER (OUTPATIENT)
Age: 33
Discharge: HOME | End: 2018-05-16
Attending: NEUROLOGICAL SURGERY
Payer: COMMERCIAL

## 2018-05-16 DIAGNOSIS — D49.6 BRAIN TUMOR (HCC): ICD-10-CM

## 2018-05-16 PROCEDURE — 70553 MRI BRAIN STEM W/O & W/DYE: CPT

## 2018-05-16 PROCEDURE — 74011250636 HC RX REV CODE- 250/636: Performed by: NEUROLOGICAL SURGERY

## 2018-05-16 PROCEDURE — A9575 INJ GADOTERATE MEGLUMI 0.1ML: HCPCS | Performed by: NEUROLOGICAL SURGERY

## 2018-05-16 RX ORDER — GADOTERATE MEGLUMINE 376.9 MG/ML
14 INJECTION INTRAVENOUS
Status: COMPLETED | OUTPATIENT
Start: 2018-05-16 | End: 2018-05-16

## 2018-05-16 RX ADMIN — GADOTERATE MEGLUMINE 14 ML: 376.9 INJECTION INTRAVENOUS at 16:00

## 2018-06-19 NOTE — ED NOTES
Assumed care of patient. Patient presents complaining of asthma exacerbation onset this morning. Patient reports episode was brought on by fumes from a truck. Patient states she used her rescue inhaler with minimal relief. Patient states that symptoms improve until she begins coughing. Patient denies any fevers or recent illnesses. Scattered inspiratory and expiratory wheezing heard upon auscultation. PIV access established with 20g in left AC. [place] drawn and sent to lab. Line flushed with 10cc of nomal saline. Line secured per protocol with Tegaderm dressing. No signs of infiltration noted at PIV site. Labs collected as ordered. Patient medicated per MAR orders. Verified order, patient identification, and allergies prior to administration. Call bell within reach of patient. Will continue to monitor and assess. Improved.

## 2018-07-25 ENCOUNTER — HOSPITAL ENCOUNTER (OUTPATIENT)
Dept: PREADMISSION TESTING | Age: 33
Discharge: HOME | End: 2018-07-25
Payer: COMMERCIAL

## 2018-07-25 ENCOUNTER — HOSPITAL ENCOUNTER (OUTPATIENT)
Dept: OTHER | Age: 33
Discharge: HOME | End: 2018-07-25
Payer: COMMERCIAL

## 2018-07-25 ENCOUNTER — HOSPITAL ENCOUNTER (OUTPATIENT)
Dept: LAB | Age: 33
Discharge: HOME | End: 2018-07-25
Payer: COMMERCIAL

## 2018-07-25 DIAGNOSIS — Z01.818 PRE-OP TESTING: ICD-10-CM

## 2018-07-25 LAB
ABO + RH BLD: NORMAL
ALBUMIN SERPL-MCNC: 3.9 G/DL (ref 3.4–5)
ALBUMIN/GLOB SERPL: 1 {RATIO} (ref 0.8–1.7)
ALP SERPL-CCNC: 85 U/L (ref 45–117)
ALT SERPL-CCNC: 36 U/L (ref 13–56)
ANION GAP SERPL CALC-SCNC: 11 MMOL/L (ref 3–18)
APPEARANCE UR: CLEAR
APTT PPP: 29.2 SEC (ref 23–36.4)
AST SERPL-CCNC: 20 U/L (ref 15–37)
BACTERIA URNS QL MICRO: NEGATIVE /HPF
BILIRUB SERPL-MCNC: 0.3 MG/DL (ref 0.2–1)
BILIRUB UR QL: NEGATIVE
BLOOD GROUP ANTIBODIES SERPL: NORMAL
BUN SERPL-MCNC: 11 MG/DL (ref 7–18)
BUN/CREAT SERPL: 14 (ref 12–20)
CALCIUM SERPL-MCNC: 8.7 MG/DL (ref 8.5–10.1)
CHLORIDE SERPL-SCNC: 109 MMOL/L (ref 100–108)
CO2 SERPL-SCNC: 21 MMOL/L (ref 21–32)
COLOR UR: YELLOW
CREAT SERPL-MCNC: 0.8 MG/DL (ref 0.6–1.3)
EPITH CASTS URNS QL MICRO: NORMAL /LPF (ref 0–5)
ERYTHROCYTE [DISTWIDTH] IN BLOOD BY AUTOMATED COUNT: 12.6 % (ref 11.6–14.5)
GLOBULIN SER CALC-MCNC: 4 G/DL (ref 2–4)
GLUCOSE SERPL-MCNC: 87 MG/DL (ref 74–99)
GLUCOSE UR STRIP.AUTO-MCNC: NEGATIVE MG/DL
HCT VFR BLD AUTO: 41.2 % (ref 35–45)
HGB BLD-MCNC: 13.9 G/DL (ref 12–16)
HGB UR QL STRIP: ABNORMAL
INR PPP: 0.9 (ref 0.8–1.2)
KETONES UR QL STRIP.AUTO: NEGATIVE MG/DL
LEUKOCYTE ESTERASE UR QL STRIP.AUTO: ABNORMAL
MCH RBC QN AUTO: 31.5 PG (ref 24–34)
MCHC RBC AUTO-ENTMCNC: 33.7 G/DL (ref 31–37)
MCV RBC AUTO: 93.4 FL (ref 74–97)
NITRITE UR QL STRIP.AUTO: NEGATIVE
PH UR STRIP: 7 [PH] (ref 5–8)
PLATELET # BLD AUTO: 345 K/UL (ref 135–420)
PMV BLD AUTO: 11 FL (ref 9.2–11.8)
POTASSIUM SERPL-SCNC: 3.8 MMOL/L (ref 3.5–5.5)
PROT SERPL-MCNC: 7.9 G/DL (ref 6.4–8.2)
PROT UR STRIP-MCNC: NEGATIVE MG/DL
PROTHROMBIN TIME: 11.6 SEC (ref 11.5–15.2)
RBC # BLD AUTO: 4.41 M/UL (ref 4.2–5.3)
RBC #/AREA URNS HPF: NORMAL /HPF (ref 0–5)
SODIUM SERPL-SCNC: 141 MMOL/L (ref 136–145)
SP GR UR REFRACTOMETRY: 1.01 (ref 1–1.03)
SPECIMEN EXP DATE BLD: NORMAL
UROBILINOGEN UR QL STRIP.AUTO: 0.2 EU/DL (ref 0.2–1)
WBC # BLD AUTO: 6.3 K/UL (ref 4.6–13.2)
WBC URNS QL MICRO: NORMAL /HPF (ref 0–4)

## 2018-07-25 PROCEDURE — 93005 ELECTROCARDIOGRAM TRACING: CPT

## 2018-07-25 PROCEDURE — 36415 COLL VENOUS BLD VENIPUNCTURE: CPT | Performed by: NEUROLOGICAL SURGERY

## 2018-07-25 PROCEDURE — 86900 BLOOD TYPING SEROLOGIC ABO: CPT | Performed by: NEUROLOGICAL SURGERY

## 2018-07-25 PROCEDURE — 81001 URINALYSIS AUTO W/SCOPE: CPT | Performed by: NEUROLOGICAL SURGERY

## 2018-07-25 PROCEDURE — 85730 THROMBOPLASTIN TIME PARTIAL: CPT | Performed by: NEUROLOGICAL SURGERY

## 2018-07-25 PROCEDURE — 85027 COMPLETE CBC AUTOMATED: CPT | Performed by: NEUROLOGICAL SURGERY

## 2018-07-25 PROCEDURE — 71046 X-RAY EXAM CHEST 2 VIEWS: CPT

## 2018-07-25 PROCEDURE — 80053 COMPREHEN METABOLIC PANEL: CPT | Performed by: NEUROLOGICAL SURGERY

## 2018-07-25 PROCEDURE — 85610 PROTHROMBIN TIME: CPT | Performed by: NEUROLOGICAL SURGERY

## 2018-07-25 PROCEDURE — 87086 URINE CULTURE/COLONY COUNT: CPT | Performed by: NEUROLOGICAL SURGERY

## 2018-07-25 RX ORDER — MONTELUKAST SODIUM 10 MG/1
10 TABLET ORAL
COMMUNITY

## 2018-07-25 RX ORDER — CEPHALEXIN 250 MG/1
1 CAPSULE ORAL 2 TIMES DAILY
COMMUNITY
Start: 2018-04-18

## 2018-07-25 NOTE — PERIOP NOTES
PAT - SURGICAL PRE-ADMISSION INSTRUCTIONS    NAME:  Gerald Eng                                                          TODAY'S DATE:  7/25/2018    SURGERY DATE:  8/2/2018                                  SURGERY ARRIVAL TIME:   0630    1. Do NOT eat or drink anything, including candy or gum, after MIDNIGHT on 08/01/18 , unless you have specific instructions from your Surgeon or Anesthesia Provider to do so. 2. No smoking on the day of surgery. 3. No alcohol 24 hours prior to the day of surgery. 4. No recreational drugs for one week prior to the day of surgery. 5. Leave all valuables, including money/purse, at home. 6. Remove all jewelry, nail polish, makeup (including mascara); no lotions, powders, deodorant, or perfume/cologne/after shave. 7. Glasses/Contact lenses and Dentures may be worn to the hospital.  They will be removed prior to surgery. 8. Call your doctor if symptoms of a cold or illness develop within 24 ours prior to surgery. 9. AN ADULT MUST DRIVE YOU HOME AFTER OUTPATIENT SURGERY. 10. If you are having an OUTPATIENT procedure, please make arrangements for a responsible adult to be with you for 24 hours after your surgery. 11. If you are admitted to the hospital, you will be assigned to a bed after surgery is complete. Normally a family member will not be able to see you until you are in your assigned bed. 15. Family is encouraged to accompany you to the hospital.  We ask visitors in the treatment area to be limited to ONE person at a time to ensure patient privacy. EXCEPTIONS WILL BE MADE AS NEEDED. 15. Children under 12 are discouraged from entering the treatment area and need to be supervised by an adult when in the waiting room. Special Instructions:    Bring inhaler. , Take these medications the morning of surgery with a sip of water:  Use Advair    Patient Prep:    use CHG solution    These surgical instructions were reviewed with Candis Quesada during the PAT visit. Directions: On the morning of surgery, please go to the [facility]. Enter the building from the [facility] Emergency Department entrance, 1st floor (next to the Emergency Room entrance). Take the elevator to the 2nd floor. Sign in at the Registration Desk.     If you have any questions and/or concerns, please do not hesitate to call:  (Prior to the day of surgery)  [facility] unit:  [phone]  (Day of surgery)  [facility] unit:  [phone]

## 2018-07-26 LAB
ATRIAL RATE: 55 BPM
CALCULATED P AXIS, ECG09: 51 DEGREES
CALCULATED R AXIS, ECG10: 81 DEGREES
CALCULATED T AXIS, ECG11: 68 DEGREES
DIAGNOSIS, 93000: NORMAL
P-R INTERVAL, ECG05: 138 MS
Q-T INTERVAL, ECG07: 448 MS
QRS DURATION, ECG06: 80 MS
QTC CALCULATION (BEZET), ECG08: 428 MS
VENTRICULAR RATE, ECG03: 55 BPM

## 2018-07-27 LAB
BACTERIA SPEC CULT: NORMAL
SERVICE CMNT-IMP: NORMAL

## 2018-08-01 ENCOUNTER — HOSPITAL ENCOUNTER (OUTPATIENT)
Dept: MRI IMAGING | Age: 33
Discharge: HOME | DRG: 055 | End: 2018-08-01
Attending: NEUROLOGICAL SURGERY
Payer: COMMERCIAL

## 2018-08-01 ENCOUNTER — ANESTHESIA EVENT (OUTPATIENT)
Dept: SURGERY | Age: 33
DRG: 055 | End: 2018-08-01
Payer: COMMERCIAL

## 2018-08-01 VITALS — WEIGHT: 143 LBS | BODY MASS INDEX: 25.33 KG/M2

## 2018-08-01 DIAGNOSIS — D49.6 BRAIN TUMOR (HCC): ICD-10-CM

## 2018-08-01 PROCEDURE — 74011250636 HC RX REV CODE- 250/636: Performed by: NEUROLOGICAL SURGERY

## 2018-08-01 PROCEDURE — A9575 INJ GADOTERATE MEGLUMI 0.1ML: HCPCS | Performed by: NEUROLOGICAL SURGERY

## 2018-08-01 PROCEDURE — 70553 MRI BRAIN STEM W/O & W/DYE: CPT

## 2018-08-01 RX ORDER — GADOTERATE MEGLUMINE 376.9 MG/ML
13 INJECTION INTRAVENOUS
Status: COMPLETED | OUTPATIENT
Start: 2018-08-01 | End: 2018-08-01

## 2018-08-01 RX ADMIN — GADOTERATE MEGLUMINE 13 ML: 376.9 INJECTION INTRAVENOUS at 08:08

## 2018-08-02 ENCOUNTER — HOSPITAL ENCOUNTER (INPATIENT)
Age: 33
LOS: 1 days | Discharge: HOME | DRG: 055 | End: 2018-08-03
Attending: NEUROLOGICAL SURGERY | Admitting: NEUROLOGICAL SURGERY
Payer: COMMERCIAL

## 2018-08-02 ENCOUNTER — ANESTHESIA (OUTPATIENT)
Dept: SURGERY | Age: 33
DRG: 055 | End: 2018-08-02
Payer: COMMERCIAL

## 2018-08-02 DIAGNOSIS — Z98.890 S/P CRANIOTOMY: Primary | ICD-10-CM

## 2018-08-02 PROBLEM — G43.909 MIGRAINE SYNDROME: Chronic | Status: ACTIVE | Noted: 2018-08-02

## 2018-08-02 PROBLEM — D49.6 BRAIN TUMOR (HCC): Status: ACTIVE | Noted: 2018-08-02

## 2018-08-02 PROBLEM — G40.909 EPILEPSY (HCC): Chronic | Status: ACTIVE | Noted: 2018-08-02

## 2018-08-02 PROBLEM — R56.9 SEIZURES (HCC): Chronic | Status: ACTIVE | Noted: 2018-08-02

## 2018-08-02 PROBLEM — J45.909 ASTHMA: Chronic | Status: ACTIVE | Noted: 2018-08-02

## 2018-08-02 LAB
GLUCOSE BLD STRIP.AUTO-MCNC: 130 MG/DL (ref 70–110)
HCG UR QL: NEGATIVE

## 2018-08-02 PROCEDURE — 74011250636 HC RX REV CODE- 250/636: Performed by: NEUROLOGICAL SURGERY

## 2018-08-02 PROCEDURE — 74011250636 HC RX REV CODE- 250/636

## 2018-08-02 PROCEDURE — 77030032490 HC SLV COMPR SCD KNE COVD -B: Performed by: NEUROLOGICAL SURGERY

## 2018-08-02 PROCEDURE — 74011000250 HC RX REV CODE- 250

## 2018-08-02 PROCEDURE — 74011000272 HC RX REV CODE- 272: Performed by: NEUROLOGICAL SURGERY

## 2018-08-02 PROCEDURE — 74011000250 HC RX REV CODE- 250: Performed by: NURSE PRACTITIONER

## 2018-08-02 PROCEDURE — 74011000250 HC RX REV CODE- 250: Performed by: NEUROLOGICAL SURGERY

## 2018-08-02 PROCEDURE — 81025 URINE PREGNANCY TEST: CPT

## 2018-08-02 PROCEDURE — 76060000033 HC ANESTHESIA 1 TO 1.5 HR: Performed by: NEUROLOGICAL SURGERY

## 2018-08-02 PROCEDURE — 00B03ZX EXCISION OF BRAIN, PERCUTANEOUS APPROACH, DIAGNOSTIC: ICD-10-PCS | Performed by: NEUROLOGICAL SURGERY

## 2018-08-02 PROCEDURE — 77030004391 HC BUR FLUT MEDT -C: Performed by: NEUROLOGICAL SURGERY

## 2018-08-02 PROCEDURE — 65610000009 HC RM ICU NEURO

## 2018-08-02 PROCEDURE — 74011250636 HC RX REV CODE- 250/636: Performed by: NURSE ANESTHETIST, CERTIFIED REGISTERED

## 2018-08-02 PROCEDURE — 82962 GLUCOSE BLOOD TEST: CPT

## 2018-08-02 PROCEDURE — 77030019551 HC KT TRAJ BIOP GD MEDT -G: Performed by: NEUROLOGICAL SURGERY

## 2018-08-02 PROCEDURE — 88333 PATH CONSLTJ SURG CYTO XM 1: CPT | Performed by: NEUROLOGICAL SURGERY

## 2018-08-02 PROCEDURE — 74011250637 HC RX REV CODE- 250/637: Performed by: NURSE ANESTHETIST, CERTIFIED REGISTERED

## 2018-08-02 PROCEDURE — 74011250637 HC RX REV CODE- 250/637: Performed by: NURSE PRACTITIONER

## 2018-08-02 PROCEDURE — 77030030722 HC PIN SKULL MAYFLD INLC -B: Performed by: NEUROLOGICAL SURGERY

## 2018-08-02 PROCEDURE — 88307 TISSUE EXAM BY PATHOLOGIST: CPT | Performed by: NEUROLOGICAL SURGERY

## 2018-08-02 PROCEDURE — 77030020268 HC MISC GENERAL SUPPLY: Performed by: NEUROLOGICAL SURGERY

## 2018-08-02 PROCEDURE — 88331 PATH CONSLTJ SURG 1 BLK 1SPC: CPT | Performed by: NEUROLOGICAL SURGERY

## 2018-08-02 PROCEDURE — C1729 CATH, DRAINAGE: HCPCS | Performed by: NEUROLOGICAL SURGERY

## 2018-08-02 PROCEDURE — 74011250636 HC RX REV CODE- 250/636: Performed by: NURSE PRACTITIONER

## 2018-08-02 PROCEDURE — 77030018673: Performed by: NEUROLOGICAL SURGERY

## 2018-08-02 PROCEDURE — 77030003388 HC CATH IV ANGIOCATH BD -A: Performed by: NEUROLOGICAL SURGERY

## 2018-08-02 PROCEDURE — 77030019552 HC NDL BIOP PASS MEDT -F: Performed by: NEUROLOGICAL SURGERY

## 2018-08-02 PROCEDURE — 77030002916 HC SUT ETHLN J&J -A: Performed by: NEUROLOGICAL SURGERY

## 2018-08-02 PROCEDURE — 77030003666 HC NDL SPINAL BD -A: Performed by: NEUROLOGICAL SURGERY

## 2018-08-02 PROCEDURE — 88334 PATH CONSLTJ SURG CYTO XM EA: CPT | Performed by: NEUROLOGICAL SURGERY

## 2018-08-02 PROCEDURE — 77030008462 HC STPLR SKN PROX J&J -A: Performed by: NEUROLOGICAL SURGERY

## 2018-08-02 PROCEDURE — 77030018836 HC SOL IRR NACL ICUM -A: Performed by: NEUROLOGICAL SURGERY

## 2018-08-02 PROCEDURE — 76010000161 HC OR TIME 1 TO 1.5 HR INTENSV-TIER 1: Performed by: NEUROLOGICAL SURGERY

## 2018-08-02 PROCEDURE — 77030018846 HC SOL IRR STRL H20 ICUM -A: Performed by: NEUROLOGICAL SURGERY

## 2018-08-02 RX ORDER — MONTELUKAST SODIUM 10 MG/1
10 TABLET ORAL
Status: DISCONTINUED | OUTPATIENT
Start: 2018-08-02 | End: 2018-08-03 | Stop reason: HOSPADM

## 2018-08-02 RX ORDER — OXYCODONE AND ACETAMINOPHEN 5; 325 MG/1; MG/1
1 TABLET ORAL ONCE
Status: ACTIVE | OUTPATIENT
Start: 2018-08-02 | End: 2018-08-02

## 2018-08-02 RX ORDER — VECURONIUM BROMIDE FOR INJECTION 1 MG/ML
INJECTION, POWDER, LYOPHILIZED, FOR SOLUTION INTRAVENOUS AS NEEDED
Status: DISCONTINUED | OUTPATIENT
Start: 2018-08-02 | End: 2018-08-02 | Stop reason: HOSPADM

## 2018-08-02 RX ORDER — OXYCODONE AND ACETAMINOPHEN 5; 325 MG/1; MG/1
1-2 TABLET ORAL
Status: DISCONTINUED | OUTPATIENT
Start: 2018-08-02 | End: 2018-08-02

## 2018-08-02 RX ORDER — SODIUM CHLORIDE, SODIUM LACTATE, POTASSIUM CHLORIDE, CALCIUM CHLORIDE 600; 310; 30; 20 MG/100ML; MG/100ML; MG/100ML; MG/100ML
50 INJECTION, SOLUTION INTRAVENOUS CONTINUOUS
Status: DISCONTINUED | OUTPATIENT
Start: 2018-08-03 | End: 2018-08-02

## 2018-08-02 RX ORDER — CEFAZOLIN SODIUM 2 G/50ML
2 SOLUTION INTRAVENOUS EVERY 8 HOURS
Status: COMPLETED | OUTPATIENT
Start: 2018-08-02 | End: 2018-08-03

## 2018-08-02 RX ORDER — MORPHINE SULFATE 4 MG/ML
2-4 INJECTION INTRAVENOUS
Status: DISCONTINUED | OUTPATIENT
Start: 2018-08-02 | End: 2018-08-03 | Stop reason: HOSPADM

## 2018-08-02 RX ORDER — SUMATRIPTAN AND NAPROXEN SODIUM 85; 500 MG/1; MG/1
1 TABLET, FILM COATED ORAL
Status: DISCONTINUED | OUTPATIENT
Start: 2018-08-02 | End: 2018-08-02 | Stop reason: ALTCHOICE

## 2018-08-02 RX ORDER — SUMATRIPTAN 25 MG/1
75 TABLET, FILM COATED ORAL
Status: DISCONTINUED | OUTPATIENT
Start: 2018-08-02 | End: 2018-08-03 | Stop reason: HOSPADM

## 2018-08-02 RX ORDER — OXYCODONE AND ACETAMINOPHEN 5; 325 MG/1; MG/1
2 TABLET ORAL
Status: DISCONTINUED | OUTPATIENT
Start: 2018-08-02 | End: 2018-08-03 | Stop reason: HOSPADM

## 2018-08-02 RX ORDER — CEFAZOLIN SODIUM 2 G/50ML
2 SOLUTION INTRAVENOUS
Status: COMPLETED | OUTPATIENT
Start: 2018-08-02 | End: 2018-08-02

## 2018-08-02 RX ORDER — ALBUTEROL SULFATE 0.83 MG/ML
2.5 SOLUTION RESPIRATORY (INHALATION)
Status: DISCONTINUED | OUTPATIENT
Start: 2018-08-02 | End: 2018-08-03 | Stop reason: HOSPADM

## 2018-08-02 RX ORDER — BACITRACIN 500 [USP'U]/G
OINTMENT TOPICAL AS NEEDED
Status: DISCONTINUED | OUTPATIENT
Start: 2018-08-02 | End: 2018-08-03 | Stop reason: HOSPADM

## 2018-08-02 RX ORDER — TOPIRAMATE 100 MG/1
100 TABLET, FILM COATED ORAL
Status: DISCONTINUED | OUTPATIENT
Start: 2018-08-02 | End: 2018-08-03 | Stop reason: HOSPADM

## 2018-08-02 RX ORDER — ONDANSETRON 2 MG/ML
4 INJECTION INTRAMUSCULAR; INTRAVENOUS
Status: DISCONTINUED | OUTPATIENT
Start: 2018-08-02 | End: 2018-08-03 | Stop reason: HOSPADM

## 2018-08-02 RX ORDER — DEXTROSE 50 % IN WATER (D50W) INTRAVENOUS SYRINGE
25-50 AS NEEDED
Status: DISCONTINUED | OUTPATIENT
Start: 2018-08-02 | End: 2018-08-03 | Stop reason: HOSPADM

## 2018-08-02 RX ORDER — FENTANYL CITRATE 50 UG/ML
INJECTION, SOLUTION INTRAMUSCULAR; INTRAVENOUS AS NEEDED
Status: DISCONTINUED | OUTPATIENT
Start: 2018-08-02 | End: 2018-08-02 | Stop reason: HOSPADM

## 2018-08-02 RX ORDER — ONDANSETRON 2 MG/ML
INJECTION INTRAMUSCULAR; INTRAVENOUS AS NEEDED
Status: DISCONTINUED | OUTPATIENT
Start: 2018-08-02 | End: 2018-08-02 | Stop reason: HOSPADM

## 2018-08-02 RX ORDER — FAMOTIDINE 20 MG/1
20 TABLET, FILM COATED ORAL ONCE
Status: COMPLETED | OUTPATIENT
Start: 2018-08-03 | End: 2018-08-02

## 2018-08-02 RX ORDER — SODIUM CHLORIDE AND POTASSIUM CHLORIDE .9; .15 G/100ML; G/100ML
SOLUTION INTRAVENOUS CONTINUOUS
Status: DISCONTINUED | OUTPATIENT
Start: 2018-08-02 | End: 2018-08-03 | Stop reason: HOSPADM

## 2018-08-02 RX ORDER — MAGNESIUM SULFATE 100 %
4 CRYSTALS MISCELLANEOUS AS NEEDED
Status: DISCONTINUED | OUTPATIENT
Start: 2018-08-02 | End: 2018-08-03 | Stop reason: HOSPADM

## 2018-08-02 RX ORDER — HYDROMORPHONE HYDROCHLORIDE 2 MG/ML
0.5 INJECTION, SOLUTION INTRAMUSCULAR; INTRAVENOUS; SUBCUTANEOUS
Status: DISCONTINUED | OUTPATIENT
Start: 2018-08-02 | End: 2018-08-02

## 2018-08-02 RX ORDER — NEOSTIGMINE METHYLSULFATE 5 MG/5 ML
SYRINGE (ML) INTRAVENOUS AS NEEDED
Status: DISCONTINUED | OUTPATIENT
Start: 2018-08-02 | End: 2018-08-02 | Stop reason: HOSPADM

## 2018-08-02 RX ORDER — ACETAMINOPHEN 325 MG/1
650 TABLET ORAL
Status: DISCONTINUED | OUTPATIENT
Start: 2018-08-02 | End: 2018-08-03 | Stop reason: HOSPADM

## 2018-08-02 RX ORDER — SODIUM CHLORIDE 0.9 % (FLUSH) 0.9 %
5-10 SYRINGE (ML) INJECTION AS NEEDED
Status: DISCONTINUED | OUTPATIENT
Start: 2018-08-02 | End: 2018-08-03 | Stop reason: HOSPADM

## 2018-08-02 RX ORDER — NAPROXEN 250 MG/1
500 TABLET ORAL
Status: DISCONTINUED | OUTPATIENT
Start: 2018-08-02 | End: 2018-08-03 | Stop reason: HOSPADM

## 2018-08-02 RX ORDER — GLYCOPYRROLATE 0.2 MG/ML
INJECTION INTRAMUSCULAR; INTRAVENOUS AS NEEDED
Status: DISCONTINUED | OUTPATIENT
Start: 2018-08-02 | End: 2018-08-02 | Stop reason: HOSPADM

## 2018-08-02 RX ORDER — ARFORMOTEROL TARTRATE 15 UG/2ML
15 SOLUTION RESPIRATORY (INHALATION)
Status: DISCONTINUED | OUTPATIENT
Start: 2018-08-02 | End: 2018-08-02

## 2018-08-02 RX ORDER — BUDESONIDE 0.5 MG/2ML
500 INHALANT ORAL
Status: DISCONTINUED | OUTPATIENT
Start: 2018-08-02 | End: 2018-08-03 | Stop reason: HOSPADM

## 2018-08-02 RX ORDER — SUCCINYLCHOLINE CHLORIDE 20 MG/ML
INJECTION INTRAMUSCULAR; INTRAVENOUS AS NEEDED
Status: DISCONTINUED | OUTPATIENT
Start: 2018-08-02 | End: 2018-08-02 | Stop reason: HOSPADM

## 2018-08-02 RX ORDER — MIDAZOLAM HYDROCHLORIDE 1 MG/ML
INJECTION, SOLUTION INTRAMUSCULAR; INTRAVENOUS AS NEEDED
Status: DISCONTINUED | OUTPATIENT
Start: 2018-08-02 | End: 2018-08-02 | Stop reason: HOSPADM

## 2018-08-02 RX ORDER — NALOXONE HYDROCHLORIDE 0.4 MG/ML
0.04 INJECTION, SOLUTION INTRAMUSCULAR; INTRAVENOUS; SUBCUTANEOUS AS NEEDED
Status: DISCONTINUED | OUTPATIENT
Start: 2018-08-02 | End: 2018-08-03 | Stop reason: HOSPADM

## 2018-08-02 RX ORDER — SODIUM CHLORIDE 0.9 % (FLUSH) 0.9 %
5-10 SYRINGE (ML) INJECTION EVERY 8 HOURS
Status: DISCONTINUED | OUTPATIENT
Start: 2018-08-02 | End: 2018-08-03 | Stop reason: HOSPADM

## 2018-08-02 RX ORDER — LIDOCAINE HYDROCHLORIDE 20 MG/ML
INJECTION, SOLUTION EPIDURAL; INFILTRATION; INTRACAUDAL; PERINEURAL AS NEEDED
Status: DISCONTINUED | OUTPATIENT
Start: 2018-08-02 | End: 2018-08-02 | Stop reason: HOSPADM

## 2018-08-02 RX ORDER — METOPROLOL TARTRATE 5 MG/5ML
2.5 INJECTION INTRAVENOUS
Status: DISCONTINUED | OUTPATIENT
Start: 2018-08-02 | End: 2018-08-03 | Stop reason: HOSPADM

## 2018-08-02 RX ORDER — FAMOTIDINE 20 MG/1
TABLET, FILM COATED ORAL
Status: DISPENSED
Start: 2018-08-02 | End: 2018-08-02

## 2018-08-02 RX ORDER — SCOLOPAMINE TRANSDERMAL SYSTEM 1 MG/1
1 PATCH, EXTENDED RELEASE TRANSDERMAL
Status: DISCONTINUED | OUTPATIENT
Start: 2018-08-02 | End: 2018-08-03 | Stop reason: HOSPADM

## 2018-08-02 RX ORDER — SODIUM CHLORIDE 0.9 % (FLUSH) 0.9 %
5-10 SYRINGE (ML) INJECTION AS NEEDED
Status: DISCONTINUED | OUTPATIENT
Start: 2018-08-02 | End: 2018-08-02

## 2018-08-02 RX ORDER — DEXAMETHASONE SODIUM PHOSPHATE 4 MG/ML
4 INJECTION, SOLUTION INTRA-ARTICULAR; INTRALESIONAL; INTRAMUSCULAR; INTRAVENOUS; SOFT TISSUE EVERY 6 HOURS
Status: COMPLETED | OUTPATIENT
Start: 2018-08-02 | End: 2018-08-03

## 2018-08-02 RX ORDER — ARFORMOTEROL TARTRATE 15 UG/2ML
15 SOLUTION RESPIRATORY (INHALATION)
Status: DISCONTINUED | OUTPATIENT
Start: 2018-08-02 | End: 2018-08-03 | Stop reason: HOSPADM

## 2018-08-02 RX ORDER — INSULIN LISPRO 100 [IU]/ML
INJECTION, SOLUTION INTRAVENOUS; SUBCUTANEOUS ONCE
Status: ACTIVE | OUTPATIENT
Start: 2018-08-03 | End: 2018-08-03

## 2018-08-02 RX ORDER — NICARDIPINE HYDROCHLORIDE 0.1 MG/ML
0-15 INJECTION INTRAVENOUS
Status: DISCONTINUED | OUTPATIENT
Start: 2018-08-02 | End: 2018-08-02

## 2018-08-02 RX ORDER — PROPOFOL 10 MG/ML
INJECTION, EMULSION INTRAVENOUS AS NEEDED
Status: DISCONTINUED | OUTPATIENT
Start: 2018-08-02 | End: 2018-08-02 | Stop reason: HOSPADM

## 2018-08-02 RX ORDER — OXYCODONE AND ACETAMINOPHEN 5; 325 MG/1; MG/1
1 TABLET ORAL
Status: DISCONTINUED | OUTPATIENT
Start: 2018-08-02 | End: 2018-08-03 | Stop reason: HOSPADM

## 2018-08-02 RX ORDER — VANCOMYCIN HYDROCHLORIDE 1 G/20ML
INJECTION, POWDER, LYOPHILIZED, FOR SOLUTION INTRAVENOUS AS NEEDED
Status: DISCONTINUED | OUTPATIENT
Start: 2018-08-02 | End: 2018-08-03 | Stop reason: HOSPADM

## 2018-08-02 RX ORDER — ONDANSETRON 2 MG/ML
4 INJECTION INTRAMUSCULAR; INTRAVENOUS ONCE
Status: COMPLETED | OUTPATIENT
Start: 2018-08-02 | End: 2018-08-02

## 2018-08-02 RX ORDER — FACIAL-BODY WIPES
10 EACH TOPICAL DAILY PRN
Status: DISCONTINUED | OUTPATIENT
Start: 2018-08-02 | End: 2018-08-03 | Stop reason: HOSPADM

## 2018-08-02 RX ORDER — HYDROMORPHONE HYDROCHLORIDE 1 MG/ML
0.5 INJECTION, SOLUTION INTRAMUSCULAR; INTRAVENOUS; SUBCUTANEOUS
Status: DISCONTINUED | OUTPATIENT
Start: 2018-08-02 | End: 2018-08-03 | Stop reason: HOSPADM

## 2018-08-02 RX ORDER — FENTANYL CITRATE 50 UG/ML
50 INJECTION, SOLUTION INTRAMUSCULAR; INTRAVENOUS AS NEEDED
Status: DISCONTINUED | OUTPATIENT
Start: 2018-08-02 | End: 2018-08-03 | Stop reason: HOSPADM

## 2018-08-02 RX ORDER — INSULIN LISPRO 100 [IU]/ML
INJECTION, SOLUTION INTRAVENOUS; SUBCUTANEOUS ONCE
Status: ACTIVE | OUTPATIENT
Start: 2018-08-02 | End: 2018-08-02

## 2018-08-02 RX ORDER — DEXAMETHASONE SODIUM PHOSPHATE 4 MG/ML
INJECTION, SOLUTION INTRA-ARTICULAR; INTRALESIONAL; INTRAMUSCULAR; INTRAVENOUS; SOFT TISSUE AS NEEDED
Status: DISCONTINUED | OUTPATIENT
Start: 2018-08-02 | End: 2018-08-02

## 2018-08-02 RX ORDER — SODIUM CHLORIDE, SODIUM LACTATE, POTASSIUM CHLORIDE, CALCIUM CHLORIDE 600; 310; 30; 20 MG/100ML; MG/100ML; MG/100ML; MG/100ML
125 INJECTION, SOLUTION INTRAVENOUS CONTINUOUS
Status: DISCONTINUED | OUTPATIENT
Start: 2018-08-02 | End: 2018-08-02

## 2018-08-02 RX ADMIN — Medication 10 ML: at 15:34

## 2018-08-02 RX ADMIN — SUCCINYLCHOLINE CHLORIDE 100 MG: 20 INJECTION INTRAMUSCULAR; INTRAVENOUS at 09:13

## 2018-08-02 RX ADMIN — FAMOTIDINE 20 MG: 10 INJECTION, SOLUTION INTRAVENOUS at 20:40

## 2018-08-02 RX ADMIN — FENTANYL CITRATE 100 MCG: 50 INJECTION, SOLUTION INTRAMUSCULAR; INTRAVENOUS at 09:13

## 2018-08-02 RX ADMIN — Medication 10 ML: at 22:18

## 2018-08-02 RX ADMIN — Medication 3 MG: at 10:24

## 2018-08-02 RX ADMIN — MONTELUKAST SODIUM 10 MG: 10 TABLET, FILM COATED ORAL at 21:52

## 2018-08-02 RX ADMIN — VECURONIUM BROMIDE FOR INJECTION 4 MG: 1 INJECTION, POWDER, LYOPHILIZED, FOR SOLUTION INTRAVENOUS at 09:19

## 2018-08-02 RX ADMIN — MIDAZOLAM HYDROCHLORIDE 2 MG: 1 INJECTION, SOLUTION INTRAMUSCULAR; INTRAVENOUS at 09:04

## 2018-08-02 RX ADMIN — GLYCOPYRROLATE 0.4 MG: 0.2 INJECTION INTRAMUSCULAR; INTRAVENOUS at 10:24

## 2018-08-02 RX ADMIN — ONDANSETRON 4 MG: 2 INJECTION INTRAMUSCULAR; INTRAVENOUS at 09:57

## 2018-08-02 RX ADMIN — FAMOTIDINE 20 MG: 20 TABLET ORAL at 07:11

## 2018-08-02 RX ADMIN — PROPOFOL 150 MG: 10 INJECTION, EMULSION INTRAVENOUS at 09:13

## 2018-08-02 RX ADMIN — PROPOFOL 50 MG: 10 INJECTION, EMULSION INTRAVENOUS at 09:19

## 2018-08-02 RX ADMIN — DEXAMETHASONE SODIUM PHOSPHATE 4 MG: 4 INJECTION, SOLUTION INTRAMUSCULAR; INTRAVENOUS at 17:15

## 2018-08-02 RX ADMIN — ONDANSETRON 4 MG: 2 INJECTION, SOLUTION INTRAMUSCULAR; INTRAVENOUS at 11:01

## 2018-08-02 RX ADMIN — SODIUM CHLORIDE AND POTASSIUM CHLORIDE: 9; 1.49 INJECTION, SOLUTION INTRAVENOUS at 15:44

## 2018-08-02 RX ADMIN — SODIUM CHLORIDE, SODIUM LACTATE, POTASSIUM CHLORIDE, AND CALCIUM CHLORIDE 50 ML/HR: 600; 310; 30; 20 INJECTION, SOLUTION INTRAVENOUS at 07:20

## 2018-08-02 RX ADMIN — TOPIRAMATE 100 MG: 100 TABLET ORAL at 21:52

## 2018-08-02 RX ADMIN — LIDOCAINE HYDROCHLORIDE 80 MG: 20 INJECTION, SOLUTION EPIDURAL; INFILTRATION; INTRACAUDAL; PERINEURAL at 09:13

## 2018-08-02 RX ADMIN — ACETAMINOPHEN 650 MG: 325 TABLET, FILM COATED ORAL at 20:39

## 2018-08-02 RX ADMIN — VECURONIUM BROMIDE FOR INJECTION 2 MG: 1 INJECTION, POWDER, LYOPHILIZED, FOR SOLUTION INTRAVENOUS at 09:22

## 2018-08-02 RX ADMIN — CEFAZOLIN SODIUM 2 G: 2 SOLUTION INTRAVENOUS at 09:23

## 2018-08-02 RX ADMIN — CEFAZOLIN SODIUM 2 G: 2 SOLUTION INTRAVENOUS at 17:19

## 2018-08-02 NOTE — ANESTHESIA POSTPROCEDURE EVALUATION
Post-Anesthesia Evaluation and Assessment Patient: An Smart MRN: [id number]  SSN: [id number] YOB: 1985  Age: 34 y.o. Sex: female Cardiovascular Function/Vital Signs Visit Vitals  BP 94/56 (BP 1 Location: Left arm, BP Patient Position: At rest)  Pulse 60  Temp 35.8 °C (96.5 °F)  Resp 18  Ht 5' 3\" (1.6 m)  Wt 64.9 kg (143 lb)  SpO2 100%  BMI 25.33 kg/m2 Patient is status post general anesthesia for Procedure(s): 
image guided STEREOTACTIC biopsy right insular lesion. Nausea/Vomiting: None Postoperative hydration reviewed and adequate. Pain: 
Pain Scale 1: Numeric (0 - 10) (08/02/18 1200) Pain Intensity 1: 2 (08/02/18 1200) Managed Neurological Status:  
Neuro (WDL): Exceptions to WDL (08/02/18 0708) Neuro Neurologic State: Alert (08/02/18 1200) Orientation Level: Appropriate for age;Oriented X4 (08/02/18 1200) Cognition: Appropriate safety awareness; Appropriate for age attention/concentration; Appropriate decision making (08/02/18 1200) Speech: Clear (08/02/18 1200) Assessment L Pupil: Round;Brisk (08/02/18 1200) Size L Pupil (mm): 3 (08/02/18 1200) Assessment R Pupil: Brisk;Round (08/02/18 1200) Size R Pupil (mm): 3 (08/02/18 1200) LUE Motor Response: Spontaneous ; Purposeful (08/02/18 1200) LLE Motor Response: Spontaneous ; Purposeful (08/02/18 1200) RUE Motor Response: Spontaneous ; Purposeful (08/02/18 1200) RLE Motor Response: Spontaneous ; Purposeful (08/02/18 1200) At baseline Mental Status and Level of Consciousness: Alert and oriented Pulmonary Status:  
O2 Device: Room air (08/02/18 1457) Adequate oxygenation and airway patent Complications related to anesthesia: None Post-anesthesia assessment completed. No concerns Signed By: Stephani Guidry MD   
 August 2, 2018

## 2018-08-02 NOTE — IP AVS SNAPSHOT
[address] [place] [address] Josefina Bautista  
[phone] Patient: Apollo Waters MRN: [id number] :1985 A check luis indicates which time of day the medication should be taken. My Medications START taking these medications Instructions Each Dose to Equal  
 Morning Noon Evening Bedtime  
 ondansetron 4 mg disintegrating tablet Commonly known as:  ZOFRAN ODT Your last dose was: Your next dose is: Take 1 Tab by mouth every eight (8) hours as needed for Nausea. Indications: PREVENTION OF POST-OPERATIVE NAUSEA AND VOMITING  
 4 mg  
    
   
   
   
  
 oxyCODONE-acetaminophen 5-325 mg per tablet Commonly known as:  PERCOCET Your last dose was: Your next dose is: Take 1 Tab by mouth every six (6) hours as needed. Max Daily Amount: 4 Tabs. Indications: Pain 1 Tab CONTINUE taking these medications Instructions Each Dose to Equal  
 Morning Noon Evening Bedtime ADVAIR DISKUS 100-50 mcg/dose diskus inhaler Generic drug:  fluticasone-salmeterol Your last dose was: Your next dose is: Take 1 Puff by inhalation two (2) times a day. 1 Puff  
    
   
   
   
  
 albuterol 2.5 mg /3 mL (0.083 %) nebulizer solution Commonly known as:  PROVENTIL VENTOLIN Your last dose was: Your next dose is:    
   
   
 3 mL by Nebulization route every four (4) hours as needed for Wheezing. 2.5 mg  
    
   
   
   
  
 MIRENA 20 mcg/24 hr (5 years) Iud  
Generic drug:  levonorgestrel Your last dose was: Your next dose is:    
   
   
 1 Device by IntraUTERine route once. 1 Device  
    
   
   
   
  
 montelukast 10 mg tablet Commonly known as:  SINGULAIR Your last dose was: Your next dose is: Take 10 mg by mouth nightly.   
 10 mg  
    
   
   
   
  
 ONE-A-DAY WOMEN'S ACTIVE 18 mg iron- 400 mcg-180 mg Tab Generic drug:  mv,Ca,min-iron-FA-guarana-caff Your last dose was: Your next dose is: Take  by mouth. SUMAtriptan-naproxen  mg tablet Commonly known as:  TREXIMET Your last dose was: Your next dose is: Take one tablet at HA onset, may repeat >2 hr x 1. Max 2 tabs in 24 hours  
     
   
   
   
  
 topiramate 100 mg tablet Commonly known as:  TOPAMAX Your last dose was: Your next dose is: Take 1 Tab by mouth nightly. 100 mg Where to Get Your Medications Information on where to get these meds will be given to you by the nurse or doctor. ! Ask your nurse or doctor about these medications  
  ondansetron 4 mg disintegrating tablet  
 oxyCODONE-acetaminophen 5-325 mg per tablet

## 2018-08-02 NOTE — PROGRESS NOTES
Problem: Falls - Risk of 
Goal: *Absence of Falls Document Isabel Beebe Fall Risk and appropriate interventions in the flowsheet. Outcome: Progressing Towards Goal 
Fall Risk Interventions: 
Mobility Interventions: Bed/chair exit alarm Medication Interventions: Bed/chair exit alarm, Evaluate medications/consider consulting pharmacy Elimination Interventions: Bed/chair exit alarm Problem: Pressure Injury - Risk of 
Goal: *Prevention of pressure injury Document [place] Scale and appropriate interventions in the flowsheet. Outcome: Progressing Towards Goal 
Pressure Injury Interventions: Activity Interventions: Increase time out of bed Mobility Interventions: HOB 30 degrees or less Nutrition Interventions: Document food/fluid/supplement intake

## 2018-08-02 NOTE — ANESTHESIA PREPROCEDURE EVALUATION
Anesthetic History No history of anesthetic complications Review of Systems / Medical History Patient summary reviewed and pertinent labs reviewed Pulmonary Asthma : well controlled Neuro/Psych  
 
seizures Comments: As a child Cardiovascular Within defined limits Exercise tolerance: >4 METS 
  
GI/Hepatic/Renal 
Within defined limits Endo/Other Within defined limits Other Findings Comments: Documentation of current medication Current medications obtained, documented and obtained? YES Risk Factors for Postoperative nausea/vomiting: 
     History of postoperative nausea/vomiting? NO Female? YES Motion sickness? NO Intended opioid administration for postoperative analgesia? YES Smoking Abstinence: 
Current Smoker? NO Elective Surgery? YES Seen preoperatively by anesthesiologist or proxy prior to day of surgery? YES Pt abstained from smoking 24 hours prior to anesthesia? N/A Preventive care/screening for High Blood Pressure: 
Aged 18 years and older: YES Screened for high blood pressure: YES Patients with high blood pressure referred to primary care provider 
 for BP management: YES Physical Exam 
 
Airway Mallampati: I 
TM Distance: 4 - 6 cm Neck ROM: normal range of motion Mouth opening: Normal 
 
 Cardiovascular Regular rate and rhythm,  S1 and S2 normal,  no murmur, click, rub, or gallop Rhythm: regular Rate: normal 
 
 
 
 Dental 
 
Dentition: Lower dentition intact and Upper dentition intact Pulmonary Breath sounds clear to auscultation Abdominal 
GI exam deferred Other Findings Anesthetic Plan ASA: 2 Anesthesia type: general 
 
 
 
 
Induction: Intravenous Anesthetic plan and risks discussed with: Patient

## 2018-08-02 NOTE — PROGRESS NOTES
Progress Note Patient: Luh Alvares               Sex: female          DOA: 8/2/2018 YOB: 1985      Age:  34 y.o.        LOS:  LOS: 0 days Procedure(s): 
image guided STEREOTACTIC biopsy right insular lesion SURGERY DATE: 8/2/2018 Dx:  brain tumor  d43.8 Brain tumor ([facility] [address] Past Medical History:  
Diagnosis Date  Asthma  Migraines  Neurological disorder  Seizures ([facility] [address]   
 none since age 7, no medications Past Surgical History:  
Procedure Laterality Date  HX CHOLECYSTECTOMY  HX ORTHOPAEDIC Left 2015 Ligament repair on wrist with hardware, later hardware removed  HX TONSILLECTOMY  HX TONSILLECTOMY [facility] EXTRACTION  2002 POD# 0 SUBJECTIVE: 
Seen immediately post-op. Drowsy but awakens easily. Expected pain at surgical site along with nausea. OBJECTIVE: 
Patient Vitals for the past 24 hrs: 
 Temp Pulse Resp BP SpO2  
08/02/18 1200 96.5 °F (35.8 °C) 60 18 94/56 100 % 08/02/18 1100 97.4 °F (36.3 °C) 89 19 108/75 100 % 08/02/18 1056 97.4 °F (36.3 °C) - - - -  
08/02/18 1052 - 72 14 (!) 88/62 100 % 08/02/18 0707 97.9 °F (36.6 °C) (!) 55 16 94/55 100 % Vent Intake and Output Intake/Output Summary (Last 24 hours) at 08/02/18 1425 Last data filed at 08/02/18 1027 Gross per 24 hour Intake              650 ml Output                0 ml Net              650 ml Data Recent Results (from the past 24 hour(s)) HCG URINE, QL. - POC Collection Time: 08/02/18  6:59 AM  
Result Value Ref Range Pregnancy test,urine (POC) NEGATIVE  NEG    
GLUCOSE, POC Collection Time: 08/02/18 11:59 AM  
Result Value Ref Range Glucose (POC) 130 (H) 70 - 110 mg/dL Current Facility-Administered Medications Medication Dose Route Frequency  [START ON 8/3/2018] lactated [place] infusion  50 mL/hr IntraVENous CONTINUOUS  
 sodium chloride (NS) flush 5-10 mL  5-10 mL IntraVENous Q8H  
 sodium chloride (NS) flush 5-10 mL  5-10 mL IntraVENous PRN  
 [START ON 8/3/2018] insulin lispro (HUMALOG) injection   SubCUTAneous ONCE  
 famotidine (PEPCID) 20 mg tablet  lactated Ringers infusion  125 mL/hr IntraVENous CONTINUOUS  
 sodium chloride (NS) flush 5-10 mL  5-10 mL IntraVENous PRN  
 oxyCODONE-acetaminophen (PERCOCET) 5-325 mg per tablet 1 Tab  1 Tab Oral ONCE  
 fentaNYL citrate (PF) injection 50 mcg  50 mcg IntraVENous PRN  
 naloxone (NARCAN) injection 0.04 mg  0.04 mg IntraVENous PRN  
 insulin lispro (HUMALOG) injection   SubCUTAneous ONCE  
 glucose chewable tablet 16 g  4 Tab Oral PRN  
 glucagon (GLUCAGEN) injection 1 mg  1 mg IntraMUSCular PRN  
 dextrose (D50W) injection syrg 12.5-25 g  25-50 mL IntraVENous PRN  
 bacitracin (BACITRACIN) 500 unit/gram ointment    PRN  
 gelatin adsorbable (GELFOAM) powder    PRN  
 bacitracin 50,000 Units in lactated Ringers 1,000 mL Irrigation    PRN  thrombin (bovine) (THROMBIN-JMI) 5,000 unit topical solution    PRN  
 vancomycin (VANCOCIN) injection    PRN  
 HYDROmorphone (DILAUDID) syringe 0.5 mg  0.5 mg IntraVENous Multiple  0.9% sodium chloride with KCl 20 mEq/L infusion   IntraVENous CONTINUOUS  
 ondansetron (ZOFRAN) injection 4 mg  4 mg IntraVENous Q4H PRN  
 ceFAZolin (ANCEF) 2g IVPB in 50 mL D5W  2 g IntraVENous Q8H  
 bisacodyl (DULCOLAX) suppository 10 mg  10 mg Rectal DAILY PRN  
 dexamethasone (DECADRON) 4 mg/mL injection 4 mg  4 mg IntraVENous Q6H  
 famotidine (PF) (PEPCID) 20 mg in sodium chloride 0.9% 10 mL injection  20 mg IntraVENous Q12H  
 acetaminophen (TYLENOL) tablet 650 mg  650 mg Oral Q6H PRN  
 oxyCODONE-acetaminophen (PERCOCET) 5-325 mg per tablet 1-2 Tab  1-2 Tab Oral Q4H PRN  
 morphine injection 2-4 mg  2-4 mg IntraVENous Q1H PRN  
 metoprolol (LOPRESSOR) syringe 2.5 mg  2.5 mg IntraVENous Q6H PRN  niCARdipine in Saline (CARDENE) 0.1mg/mL 200 ml premix infusion  0-15 mg/hr IntraVENous TITRATE  montelukast (SINGULAIR) tablet 10 mg  10 mg Oral QHS  topiramate (TOPAMAX) tablet 100 mg  100 mg Oral QHS  albuterol (PROVENTIL VENTOLIN) nebulizer solution 2.5 mg  2.5 mg Nebulization Q4H PRN  
 arformoterol (BROVANA) neb solution 15 mcg  15 mcg Nebulization BID RT And  
 budesonide (PULMICORT) 500 mcg/2 ml nebulizer suspension  500 mcg Nebulization BID RT  
 SUMAtriptan (IMITREX) tablet 75 mg  75 mg Oral BID PRN And  
 naproxen (NAPROSYN) tablet 500 mg  500 mg Oral BID PRN  
 scopolamine (TRANSDERM-SCOP) 1 mg over 3 days 1 Patch  1 Patch TransDERmal Q72H Physical Exam 
General: 
Neurologic: Mental status: alertness: drowsy. Oriented x 4. Speech clear and appropriate. Eyes: conjunctivae/corneas clear. PERRL, EOM's intact. Visual fields were intact to confrontation  Eye movements were full and conjugate, saccades were accurate, pursuit movements were smooth, and there was no nystagmus. The palate and tongue moved in the midline. Motor: 5/5 throughtout Skin: Incision to right frontal with suture intact, edges well approximated, no erythema, no edema, no drainage, no ecchymosis, and no hematoma. Assessment/Plan 
 
34y.o. year old female who is hospital day 0 for Brain tumor ([facility] [address] 1.)  Neurologic:  Immediately post-op s/p stereotatic biopsy right insular lesion . Pathology pending. Doing well. Expected pain at site along with nausea. PRN analgesics and antiemetics. OOB later today and mobilize. Decadron for 24 hours. History of migraine. Topamax scheduled with PRN Imitrex for rescue. Only requires rescue dosing twice a month. 
 
2.)  Cardiovascular:  Keep SBP < 140.  
 
3.)  Pulmonary:  Pulmonary toilet. 4.)  Fluids, Electrolytes, and Nutrition:  Diet as tolerated. Assessment and plan made with Dr. Sarika Cruz, LEDA 
8/2/2018 
2:25 PM

## 2018-08-02 NOTE — PROGRESS NOTES
Pt ordered Brovana/Pulmicort but would rather take her Advair that she has with her. Spo2 100%, HR 85 RR 16.

## 2018-08-02 NOTE — PROGRESS NOTES
1100 - received pt from OR, resting in bed, no apparent signs of distress, VSS 
 
1130 - discussed post OP orders with NP Mi Pemberton, new orders received 1300 - pt complains of nausea, emesis x2, zofran administered x1 
 
1530 - placed pt on bedpan, attempted to void, VSS on RA, no apparent signs of distress 1700 - pt resting comfortably in bed, VSS, no signs of distress Bedside and Verbal shift change report given to Teodoro RN (oncoming nurse) by Stanton Barrientos RN (offgoing nurse). Report included the following information SBAR, Kardex, MAR, Recent Results and Cardiac Rhythm NSR.

## 2018-08-02 NOTE — IP AVS SNAPSHOT
[address] [address] Josefina Bautista  
[phone] Patient: Jonna Nugent MRN: [id number] :1985 About your hospitalization You were admitted on:  2018 You last received care in the:  [address] [place] [address],[address] ICU You were discharged on:  August 3, 2018 Why you were hospitalized Your primary diagnosis was:  Brain Tumor (Hcc) Your diagnoses also included:  Asthma, Epilepsy (Hcc), Seizures (Hcc), Migraine Syndrome Follow-up Information Follow up With Details Comments Contact Info Gloria Juares MD   [address] [place] [address] Road [facility] [address] 
[phone] Forest Davis MD In 1 week  [address] [facility] [address] [place] [address] [phone] Discharge Orders None A check luis indicates which time of day the medication should be taken. My Medications START taking these medications Instructions Each Dose to Equal  
 Morning Noon Evening Bedtime  
 ondansetron 4 mg disintegrating tablet Commonly known as:  ZOFRAN ODT Your last dose was: Your next dose is: Take 1 Tab by mouth every eight (8) hours as needed for Nausea. Indications: PREVENTION OF POST-OPERATIVE NAUSEA AND VOMITING  
 4 mg  
    
   
   
   
  
 oxyCODONE-acetaminophen 5-325 mg per tablet Commonly known as:  PERCOCET Your last dose was: Your next dose is: Take 1 Tab by mouth every six (6) hours as needed. Max Daily Amount: 4 Tabs. Indications: Pain 1 Tab CONTINUE taking these medications Instructions Each Dose to Equal  
 Morning Noon Evening Bedtime ADVAIR DISKUS 100-50 mcg/dose diskus inhaler Generic drug:  fluticasone-salmeterol Your last dose was: Your next dose is: Take 1 Puff by inhalation two (2) times a day. 1 Puff albuterol 2.5 mg /3 mL (0.083 %) nebulizer solution Commonly known as:  PROVENTIL VENTOLIN Your last dose was: Your next dose is:    
   
   
 3 mL by Nebulization route every four (4) hours as needed for Wheezing. 2.5 mg  
    
   
   
   
  
 MIRENA 20 mcg/24 hr (5 years) Iud  
Generic drug:  levonorgestrel Your last dose was: Your next dose is:    
   
   
 1 Device by IntraUTERine route once. 1 Device  
    
   
   
   
  
 montelukast 10 mg tablet Commonly known as:  SINGULAIR Your last dose was: Your next dose is: Take 10 mg by mouth nightly. 10 mg  
    
   
   
   
  
 ONE-A-DAY WOMEN'S ACTIVE 18 mg iron- 400 mcg-180 mg Tab Generic drug:  mv,Ca,min-iron-FA-guarana-caff Your last dose was: Your next dose is: Take  by mouth. SUMAtriptan-naproxen  mg tablet Commonly known as:  TREXIMET Your last dose was: Your next dose is: Take one tablet at HA onset, may repeat >2 hr x 1. Max 2 tabs in 24 hours  
     
   
   
   
  
 topiramate 100 mg tablet Commonly known as:  TOPAMAX Your last dose was: Your next dose is: Take 1 Tab by mouth nightly. 100 mg Where to Get Your Medications Information on where to get these meds will be given to you by the nurse or doctor. ! Ask your nurse or doctor about these medications  
  ondansetron 4 mg disintegrating tablet  
 oxyCODONE-acetaminophen 5-325 mg per tablet Opioid Education Prescription Opioids: What You Need to Know: 
 
 
 After general anesthesia or intravenous sedation, for 24 hours or while taking prescription Narcotics: · Limit your activities · Do not drive and operate hazardous machinery · Do not make important personal or business decisions · Do  not drink alcoholic beverages · If you have not urinated within 8 hours after discharge, please contact your surgeon on call. Report the following to your surgeon: 
· Excessive pain, swelling, redness or odor of or around the surgical area · Temperature over 100.5 · Nausea and vomiting lasting longer than 4 hours or if unable to take medications · Any signs of decreased circulation or nerve impairment to extremity: change in color, persistent  numbness, tingling, coldness or increase pain · Any questions What to do at Home: *  Please give a list of your current medications to your Primary Care Provider. *  Please update this list whenever your medications are discontinued, doses are 
    changed, or new medications (including over-the-counter products) are added. *  Please carry medication information at all times in case of emergency situations. These are general instructions for a healthy lifestyle: No smoking/ No tobacco products/ Avoid exposure to second hand smoke Surgeon General's Warning:  Quitting smoking now greatly reduces serious risk to your health. Obesity, smoking, and sedentary lifestyle greatly increases your risk for illness A healthy diet, regular physical exercise & weight monitoring are important for maintaining a healthy lifestyle You may be retaining fluid if you have a history of heart failure or if you experience any of the following symptoms:  Weight gain of 3 pounds or more overnight or 5 pounds in a week, increased swelling in our hands or feet or shortness of breath while lying flat in bed. Please call your doctor as soon as you notice any of these symptoms; do not wait until your next office visit. Recognize signs and symptoms of STROKE: 
 
F-face looks uneven A-arms unable to move or move unevenly S-speech slurred or non-existent T-time-call 911 as soon as signs and symptoms begin-DO NOT go Back to bed or wait to see if you get better-TIME IS BRAIN. Warning Signs of HEART ATTACK Call 911 if you have these symptoms: 
? Chest discomfort. Most heart attacks involve discomfort in the center of the chest that lasts more than a few minutes, or that goes away and comes back. It can feel like uncomfortable pressure, squeezing, fullness, or pain. ? Discomfort in other areas of the upper body. Symptoms can include pain or discomfort in one or both arms, the back, neck, jaw, or stomach. ? Shortness of breath with or without chest discomfort. ? Other signs may include breaking out in a cold sweat, nausea, or lightheadedness. Don't wait more than five minutes to call [address]! Fast action can save your life. Calling 911 is almost always the fastest way to get lifesaving treatment. Emergency Medical Services staff can begin treatment when they arrive  up to an hour sooner than if someone gets to the hospital by car. The discharge information has been reviewed with the patient. The patient verbalized understanding. Discharge medications reviewed with the patient and appropriate educational materials and side effects teaching were provided. Patient armband removed and shredded 
 
___________________________________________________________________________________________________________________________________Neurosurgical [address] Phone:  [phone] Fax:  [phone] Dr. Kar Noriega Discharge Instructions: With your recent surgery it is not unusual to experience some pain or discomfort in the area of previous pain or the incision.   Accordingly, you have been given pain medication for your comfort until the healing process is further along. As time progresses, you should notice the frequency and the intensity of your discomfort steadily decrease. Here are some simple post-operative instructions to help during your home convalescence. 6. Use your pain medication as directed. Refills should be requested during regular office hours and not on weekends by calling [phone]. 7. Continue any regular medicine for other conditions (e.g. blood pressure, diabetes, heart, heart problems, etc.) 8. You may ride in a car for short trips. Dr. William Phillips will discuss with you when you can drive. 9. No bending, lifting or strenuous activities. If you need to get to the floor, squat instead of bending. 10. Showers are fine and you may wash your hair. 11. Avoid exercises except for walking. Begin with frequent, but short, periods of walking and gradually build up to longer periods of time. 12. Sit for short periods of time (10-15 minutes) in the first week after surgery. 15. You may resume sexual relations as comfort level allows. 15. Notify us if you develop fever, painful redness around the incision or drainage from the wound. 8.  Dr. Kina Zhong  will be calling to provide exact date and time for follow-up appointment. She will also make arrangements for restrictions needed in order to return to work. If you have any questions, please contact our office at [phone]   Thank You Patient Signature: ________________________  Date: August 3, 2018 Xi3 Announcement We are excited to announce that we are making your provider's discharge notes available to you in Xi3. You will see these notes when they are completed and signed by the physician that discharged you from your recent hospital stay.   If you have any questions or concerns about any information you see in [employer], please call the Health Information Department where you were seen or reach out to your Primary Care Provider for more information about your plan of care. Introducing [facility]! Dear Campos Brannon: Thank you for requesting a [employer] account. Our records indicate that you already have an active GreenLancer account. You can access your account anytime at [email or web address] Did you know that you can access your hospital and ER discharge instructions at any time in GreenLancer? You can also review all of your test results from your hospital stay or ER visit. Additional Information If you have questions, please visit the Frequently Asked Questions section of the GreenLancer website at [email or web address] Remember, GreenLancer is NOT to be used for urgent needs. For medical emergencies, dial 911. Now available from your iPhone and Android! Introducing [employer] As a [employer] patient, I wanted to make you aware of our electronic visit tool called [employer]. [employer] 24/7 allows you to connect within minutes with a medical provider 24 hours a day, seven days a week via a mobile device or tablet or logging into a secure website from your computer. You can access [employer] from anywhere in the [place]. A virtual visit might be right for you when you have a simple condition and feel like you just dont want to get out of bed, or cant get away from work for an appointment, when your regular [employer] provider is not available (evenings, weekends or holidays), or when youre out of town and need minor care. Electronic visits cost only $49 and if the [employer] 24/7 provider determines a prescription is needed to treat your condition, one can be electronically transmitted to a nearby pharmacy*. Please take a moment to enroll today if you have not already done so.   The enrollment process is free and takes just a few minutes. To enroll, please download the [employer] 24/7 lior to your tablet or phone, or visit [email or web address] to enroll on your computer. And, as an [address] [place] [address] patient with a [employer] account, the results of your visits will be scanned into your electronic medical record and your primary care provider will be able to view the scanned results. We urge you to continue to see your regular [employer] provider for your ongoing medical care. And while your primary care provider may not be the one available when you seek a [employer] virtual visit, the peace of mind you get from getting a real diagnosis real time can be priceless. For more information on [employer], view our Frequently Asked Questions (FAQs) at [email or web address]. Sincerely, 
 
Jasmin Wall MD 
Chief Medical Officer [facility] Ashley Smith *:  certain medications cannot be prescribed via Dezide Providers Seen During Your Hospitalization Provider Specialty Primary office phone Brooke Hernandez MD Neurosurgery [phone] Your Primary Care Physician (PCP) Primary Care Physician Office Phone Office Fax Birdie Daily [phone] You are allergic to the following Allergen Reactions Latex Contact Dermatitis Dilantin (Phenytoin Sodium Extended) Anaphylaxis Phenobarbital Anaphylaxis Recent Documentation Height Weight BMI OB Status Smoking Status 1.6 m 64.9 kg 25.33 kg/m2 Having regular periods Never Smoker Emergency Contacts Name Discharge Info Relation Home Work Mobile Ramiro Ayon DISCHARGE CAREGIVER [3] Spouse [3] [phone] Patient Belongings  The following personal items are in your possession at time of discharge: 
  Dental Appliances: None  Visual Aid: None      Home Medications: None Jewelry: None  Clothing: None    Other Valuables: None Please provide this summary of care documentation to your next provider. Signatures-by signing, you are acknowledging that this After Visit Summary has been reviewed with you and you have received a copy. Patient Signature:  ____________________________________________________________ Date:  ____________________________________________________________  
  
Alejandro Snipe Provider Signature:  ____________________________________________________________ Date:  ____________________________________________________________

## 2018-08-02 NOTE — CONSULTS
[address] [facility]  Hospitalist Division    Consult Note    Patient: Kevon Soulier MRN: [id number]  CSN: [id number]    YOB: 1985  Age: 34 y.o. Sex: female    DOA: 8/2/2018 LOS:  LOS: 0 days        Requesting Physician: Dr. Rita Ramirez   Reason for Consultation:  Medical Management    Chief Complaint:  S/p Image guided stereotactic biopsy- Right insular lesion    Assessment/Plan     Patient Active Problem List   Diagnosis Code    Fibroid D25.9    Biliary dyskinesia K82.8    Brain tumor ([address] D49.6    Asthma J45.909    Epilepsy ([address] G40.909    Seizures ([address] R56.9    Migraine syndrome G43.909       A/P:  S/p Image guided stereotactic biopsy- Right insular lesion  - Pain management and mobilization per NS    Seizure disorder  - seizure precautions  - AED per NS     Migraines  - Topamax  - Treximet     Asthma  - Singulair  - Advair   - Albuterol     DVT Prophylaxis  - SCDs     -We appreciate the consultation for medical management and appreciate being able to be involved with their care during hospitalization. HPI:     Kevon Soulier is a 34 y.o. female with a hx of migraine headaches, childhood seizure disorder, asthma and brain tumor who underwent image guided stereotactic biopsy- Right insular lesion. Patient reported increased headaches last fall without associated visual changes or gait disturbances. Patient denied associated weakness of upper or lower extremities. Hospitalist Service consulted for medical management.      Past Medical History:   Diagnosis Date    Asthma     Migraines     Neurological disorder     Seizures ([address]     none since age 7, no medications        Past Surgical History:   Procedure Laterality Date    HX CHOLECYSTECTOMY      HX ORTHOPAEDIC Left 2015    Ligament repair on wrist with hardware, later hardware removed    HX TONSILLECTOMY      HX TONSILLECTOMY      HX WISDOM TEETH EXTRACTION  2002       Family History   Problem Relation Age of Onset    Diabetes Mother     Diabetes Maternal Grandmother     Cancer Maternal Grandmother     Stroke Paternal Grandmother     Diabetes Maternal Aunt        Social History     Social History    Marital status:      Spouse name: N/A    Number of children: N/A    Years of education: N/A     Social History Main Topics    Smoking status: Never Smoker    Smokeless tobacco: Never Used    Alcohol use Yes      Comment: Rare    Drug use: No    Sexual activity: Not Asked     Other Topics Concern    None     Social History Narrative       Prior to Admission medications    Medication Sig Start Date End Date Taking? Authorizing Provider   ADVAIR DISKUS 100-50 mcg/dose diskus inhaler Take 1 Puff by inhalation two (2) times a day. 4/18/18  Yes Historical Provider   montelukast (SINGULAIR) 10 mg tablet Take 10 mg by mouth nightly. Yes Historical Provider   mv,Ca,min-iron-FA-guarana-caff [facility]) 18 mg iron- 400 mcg-180 mg tab Take  by mouth. Yes Historical Provider   topiramate (TOPAMAX) 100 mg tablet Take 1 Tab by mouth nightly. 4/25/18  Yes Ivan Murphy NP   SUMAtriptan-naproxen (TREXIMET)  mg tablet Take one tablet at HA onset, may repeat >2 hr x 1. Max 2 tabs in 24 hours 4/25/18  Yes Ivan Murphy NP   albuterol (PROVENTIL VENTOLIN) 2.5 mg /3 mL (0.083 %) nebulizer solution 3 mL by Nebulization route every four (4) hours as needed for Wheezing. 10/19/17  Yes Heidi Spence PA-C   levonorgestrel (MIRENA) 20 mcg/24 hr (5 years) IUD 1 Device by IntraUTERine route once.     Historical Provider       Allergies   Allergen Reactions    Latex Contact Dermatitis    Dilantin [Phenytoin Sodium Extended] Anaphylaxis    Phenobarbital Anaphylaxis       Review of Systems  - fever, - chills, - fatigue, - weight loss, - night sweats   - sore throat, - sinus congestion, - lymphadenopathy, - vision changes  - CP, -  palpitations  - dyspnea on exertion, - dyspnea at rest, - cough, - hemoptysis  - nausea, - vomiting, - diarrhea, - abdominal pain, - reflux, - dysphagia  - dysuria, - hematuria, - urinary frequency  - rash, - pruritis  - back pain, - neck pain, - myalgia, - arthralgia  + H/A, - numbness, - tingling, - weakness, - slurred speech    Physical Exam:      Visit Vitals    /75 (BP 1 Location: Left arm, BP Patient Position: At rest)    Pulse 89    Temp 97.4 °F (36.3 °C)    Resp 19    Ht 5' 3\" (1.6 m)    Wt 64.9 kg (143 lb)    SpO2 100%    BMI 25.33 kg/m2       Physical Exam:  Gen: In general, this is a well nourished  female in no acute distress on room air. HEENT: Sclerae nonicteric. Oral mucous membranes moist. Scalp incision CDI  Neck: Supple with midline trachea. CV: RRR without murmur or rub appreciated. Resp:Respirations are unlabored without use of accessory muscles. Lung fields bilaterally without wheezes or rhonchi. Abd: Soft, nontender, nondistended. Normoactive bowel sounds. Extrem: Extremities are warm, without cyanosis or clubbing. No pitting pretibial edema. Palpable distal pulses X 4.   Skin: Warm, no visible rashes. Neuro: Patient is alert, oriented, and cooperative. No obvious focal defects. Moves all 4 extremities.     Labs Reviewed:    Recent Results (from the past 24 hour(s))   HCG URINE, QL. - POC    Collection Time: 08/02/18  6:59 AM   Result Value Ref Range    Pregnancy test,urine (POC) NEGATIVE  NEG     GLUCOSE, POC    Collection Time: 08/02/18 11:59 AM   Result Value Ref Range    Glucose (POC) 130 (H) 70 - 110 mg/dL               TONI [facility]  Hospitalist Division  Pager:  [phone]  Office:  [phone]

## 2018-08-03 VITALS
HEART RATE: 56 BPM | DIASTOLIC BLOOD PRESSURE: 52 MMHG | WEIGHT: 143 LBS | TEMPERATURE: 99.4 F | HEIGHT: 63 IN | BODY MASS INDEX: 25.34 KG/M2 | RESPIRATION RATE: 18 BRPM | OXYGEN SATURATION: 100 % | SYSTOLIC BLOOD PRESSURE: 91 MMHG

## 2018-08-03 LAB — GLUCOSE BLD STRIP.AUTO-MCNC: 147 MG/DL (ref 70–110)

## 2018-08-03 PROCEDURE — 74011250637 HC RX REV CODE- 250/637: Performed by: NURSE PRACTITIONER

## 2018-08-03 PROCEDURE — 82962 GLUCOSE BLOOD TEST: CPT

## 2018-08-03 PROCEDURE — 74011000250 HC RX REV CODE- 250: Performed by: NURSE PRACTITIONER

## 2018-08-03 PROCEDURE — 74011250636 HC RX REV CODE- 250/636: Performed by: NURSE PRACTITIONER

## 2018-08-03 RX ORDER — OXYCODONE AND ACETAMINOPHEN 5; 325 MG/1; MG/1
1 TABLET ORAL
Qty: 20 TAB | Refills: 0 | Status: SHIPPED | OUTPATIENT
Start: 2018-08-03

## 2018-08-03 RX ORDER — ONDANSETRON 4 MG/1
4 TABLET, ORALLY DISINTEGRATING ORAL
Qty: 6 TAB | Refills: 0 | Status: SHIPPED | OUTPATIENT
Start: 2018-08-03

## 2018-08-03 RX ADMIN — DEXAMETHASONE SODIUM PHOSPHATE 4 MG: 4 INJECTION, SOLUTION INTRAMUSCULAR; INTRAVENOUS at 00:00

## 2018-08-03 RX ADMIN — CEFAZOLIN SODIUM 2 G: 2 SOLUTION INTRAVENOUS at 00:00

## 2018-08-03 RX ADMIN — FAMOTIDINE 20 MG: 10 INJECTION, SOLUTION INTRAVENOUS at 08:32

## 2018-08-03 RX ADMIN — ACETAMINOPHEN 650 MG: 325 TABLET, FILM COATED ORAL at 06:09

## 2018-08-03 RX ADMIN — DEXAMETHASONE SODIUM PHOSPHATE 4 MG: 4 INJECTION, SOLUTION INTRAMUSCULAR; INTRAVENOUS at 11:24

## 2018-08-03 RX ADMIN — DEXAMETHASONE SODIUM PHOSPHATE 4 MG: 4 INJECTION, SOLUTION INTRAMUSCULAR; INTRAVENOUS at 06:09

## 2018-08-03 RX ADMIN — Medication 10 ML: at 06:09

## 2018-08-03 NOTE — PROGRESS NOTES
0800-Received pt resting in bed with eyes open, no sign of distress, vs stable, calm and cooperative, incision site clean dry and intact, will continue to monitor 1000-Sitting up in bed eating breakfast, no sign of distress vss

## 2018-08-03 NOTE — PROGRESS NOTES
Problem: Falls - Risk of 
Goal: *Absence of Falls Document Luba Primes Fall Risk and appropriate interventions in the flowsheet. Outcome: Progressing Towards Goal 
Fall Risk Interventions: 
Mobility Interventions: Assess mobility with egress test, Patient to call before getting OOB Medication Interventions: Patient to call before getting OOB Elimination Interventions: Patient to call for help with toileting needs, Toileting schedule/hourly rounds Problem: Pressure Injury - Risk of 
Goal: *Prevention of pressure injury Document Evert Scale and appropriate interventions in the flowsheet. Outcome: Progressing Towards Goal 
Pressure Injury Interventions: Activity Interventions: Pressure redistribution bed/mattress(bed type) Mobility Interventions: HOB 30 degrees or less Nutrition Interventions: Document food/fluid/supplement intake

## 2018-08-03 NOTE — PROGRESS NOTES
Progress Note Patient: Megan Kay               Sex: female          DOA: 8/2/2018 YOB: 1985      Age:  34 y.o.        LOS:  LOS: 1 day Procedure(s): 
image guided STEREOTACTIC biopsy right insular lesion SURGERY DATE: 8/2/2018 Dx:  brain tumor  d43.8 Brain tumor ([facility] [address] Past Medical History:  
Diagnosis Date  Asthma  Migraines  Neurological disorder  Seizures ([facility] [address]   
 none since age 7, no medications Past Surgical History:  
Procedure Laterality Date  HX CHOLECYSTECTOMY  HX ORTHOPAEDIC Left 2015 Ligament repair on wrist with hardware, later hardware removed  HX TONSILLECTOMY  HX TONSILLECTOMY [address] EXTRACTION  2002 POD# 1 SUBJECTIVE: 
Doing well. Headache mild and controlled. Nausea controlled. +Flatus and voiding. OBJECTIVE: 
Patient Vitals for the past 24 hrs: 
 Temp Pulse Resp BP SpO2  
08/03/18 1000 - (!) 56 18 91/52 100 % 08/03/18 0901 - 73 26 - 94 % 08/03/18 0900 - 60 21 (!) 88/56 -  
08/03/18 0824 99.4 °F (37.4 °C) (!) 54 12 94/58 99 % 08/03/18 0800 - (!) 56 16 - 99 % 08/03/18 0700 - (!) 44 14 92/52 99 % 08/03/18 0600 - (!) 59 17 97/60 -  
08/03/18 0500 - (!) 46 16 (!) 83/44 98 % 08/03/18 0400 98.3 °F (36.8 °C) (!) 51 22 91/54 99 % 08/03/18 0300 - (!) 44 15 (!) 87/56 98 % 08/03/18 0200 - 69 22 102/65 99 % 08/03/18 0100 - (!) 47 14 92/52 98 % 08/03/18 0000 98.2 °F (36.8 °C) (!) 49 12 92/51 98 % 08/02/18 2300 - (!) 53 16 92/52 98 % 08/02/18 2200 - 60 20 101/60 100 % 08/02/18 2100 - (!) 50 14 100/60 100 % 08/02/18 2000 98 °F (36.7 °C) (!) 56 18 98/57 100 % 08/02/18 1900 - 64 21 98/50 100 % 08/02/18 1800 - (!) 57 15 98/55 100 % 08/02/18 1700 - (!) 57 15 95/56 100 % 08/02/18 1600 97 °F (36.1 °C) 66 18 101/66 100 % 08/02/18 1500 - 68 19 98/58 100 % 08/02/18 1457 - - - - 100 % 08/02/18 1400 - 66 16 98/54 97 % 08/02/18 1300 - 63 14 93/51 100 % 08/02/18 1200 96.5 °F (35.8 °C) 60 18 94/56 100 % 08/02/18 1100 97.4 °F (36.3 °C) 89 19 108/75 100 % 08/02/18 1056 97.4 °F (36.3 °C) - - - -  
08/02/18 1052 - 72 14 (!) 88/62 100 % Vent Intake and Output Intake/Output Summary (Last 24 hours) at 08/03/18 1027 Last data filed at 08/03/18 1000 Gross per 24 hour Intake             1850 ml Output              925 ml Net              925 ml Data Recent Results (from the past 24 hour(s)) GLUCOSE, POC Collection Time: 08/02/18 11:59 AM  
Result Value Ref Range Glucose (POC) 130 (H) 70 - 110 mg/dL GLUCOSE, POC Collection Time: 08/03/18 12:12 AM  
Result Value Ref Range Glucose (POC) 147 (H) 70 - 110 mg/dL Current Facility-Administered Medications Medication Dose Route Frequency  sodium chloride (NS) flush 5-10 mL  5-10 mL IntraVENous Q8H  
 insulin lispro (HUMALOG) injection   SubCUTAneous ONCE  
 sodium chloride (NS) flush 5-10 mL  5-10 mL IntraVENous PRN  
 fentaNYL citrate (PF) injection 50 mcg  50 mcg IntraVENous PRN  
 naloxone (NARCAN) injection 0.04 mg  0.04 mg IntraVENous PRN  
 glucose chewable tablet 16 g  4 Tab Oral PRN  
 glucagon (GLUCAGEN) injection 1 mg  1 mg IntraMUSCular PRN  
 dextrose (D50W) injection syrg 12.5-25 g  25-50 mL IntraVENous PRN  
 bacitracin (BACITRACIN) 500 unit/gram ointment    PRN  
 gelatin adsorbable (GELFOAM) powder    PRN  
 bacitracin 50,000 Units in lactated Ringers 1,000 mL Irrigation    PRN  thrombin (bovine) (THROMBIN-JMI) 5,000 unit topical solution    PRN  
 vancomycin (VANCOCIN) injection    PRN  
 HYDROmorphone (DILAUDID) syringe 0.5 mg  0.5 mg IntraVENous Multiple  0.9% sodium chloride with KCl 20 mEq/L infusion   IntraVENous CONTINUOUS  
 ondansetron (ZOFRAN) injection 4 mg  4 mg IntraVENous Q4H PRN  
 bisacodyl (DULCOLAX) suppository 10 mg  10 mg Rectal DAILY PRN  
 dexamethasone (DECADRON) 4 mg/mL injection 4 mg  4 mg IntraVENous Q6H  
 famotidine (PF) (PEPCID) 20 mg in sodium chloride 0.9% 10 mL injection  20 mg IntraVENous Q12H  
 acetaminophen (TYLENOL) tablet 650 mg  650 mg Oral Q6H PRN  
 morphine injection 2-4 mg  2-4 mg IntraVENous Q1H PRN  
 metoprolol (LOPRESSOR) injection 2.5 mg  2.5 mg IntraVENous Q6H PRN  
 montelukast (SINGULAIR) tablet 10 mg  10 mg Oral QHS  topiramate (TOPAMAX) tablet 100 mg  100 mg Oral QHS  albuterol (PROVENTIL VENTOLIN) nebulizer solution 2.5 mg  2.5 mg Nebulization Q4H PRN  
 arformoterol (BROVANA) neb solution 15 mcg  15 mcg Nebulization BID RT And  
 budesonide (PULMICORT) 500 mcg/2 ml nebulizer suspension  500 mcg Nebulization BID RT  
 SUMAtriptan (IMITREX) tablet 75 mg  75 mg Oral BID PRN And  
 naproxen (NAPROSYN) tablet 500 mg  500 mg Oral BID PRN  
 scopolamine (TRANSDERM-SCOP) 1 mg over 3 days 1 Patch  1 Patch TransDERmal Q72H  niCARdipine (CARDENE) 25 mg in 0.9% sodium chloride 250 mL infusion  0-15 mg/hr IntraVENous TITRATE  oxyCODONE-acetaminophen (PERCOCET) 5-325 mg per tablet 1 Tab  1 Tab Oral Q4H PRN Or  
 oxyCODONE-acetaminophen (PERCOCET) 5-325 mg per tablet 2 Tab  2 Tab Oral Q4H PRN Physical Exam 
General: 
Neurologic: Mental status: alertness: alert. Oriented x 4. Speech clear and appropriate. Eyes: conjunctivae/corneas clear. PERRL, EOM's intact. Visual fields were intact to confrontation  Eye movements were full and conjugate, saccades were accurate, pursuit movements were smooth, and there was no nystagmus. The palate and tongue moved in the midline. Motor: 5/5 throughtout Skin: Incision to right frontal with suture intact, edges well approximated, no erythema, no edema, no drainage, no ecchymosis, and no hematoma. Assessment/Plan 
 
34y.o. year old female who is hospital day 1 for Brain tumor ([facility] [address] 1.)  Neurologic:  Immediately post-op s/p stereotatic biopsy right insular lesion .   Pathology pending. Doing well. Expected pain at site along with nausea. PRN analgesics and antiemetics. Home today. 2.)  Cardiovascular:  Keep SBP < 140.  
 
3.)  Pulmonary:  Pulmonary toilet. 4.)  Fluids, Electrolytes, and Nutrition:  Diet as tolerated. Assessment and plan made with Dr. Salena Irvin, LEDA 
8/3/2018

## 2018-08-03 NOTE — OP NOTES
[facility]  OPERATIVE REPORT    Lucy Mason  MR#: [id number]  : 1985  ACCOUNT #: [de-identified]   DATE OF SERVICE: 2018    PREOPERATIVE DIAGNOSIS:  Right insular tumor/lesion. POSTOPERATIVE DIAGNOSIS:  Right insular tumor/lesion. PROCEDURES PERFORMED:  Image-guided stereotactic biopsy anterior right insular lesion. SURGEON:  Kate Ojeda MD    ANESTHESIA:  General orotracheal.    ESTIMATED BLOOD LOSS:  Minimal.    SPECIMENS REMOVED: tumor    COMPLICATIONS:  None. IMPLANTS:  None. ASSISTANT:  SA.    BRIEF CLINICAL NOTE:  This is a 18-year-old woman in whom we are following an incidental lesion in the right anterior insula. It does not enhance. She has requested to undergo biopsy. PROCEDURE IN DETAIL:  After informed consent was given, the patient brought to the operating room and underwent the induction of general orotracheal anesthesia without difficulty. Following this, she was carefully positioned supine upon the operating table, all pressure points carefully padded. The head was maintained in the Barcenas skull clamp and then registered the facial and scalp anatomy with the Stealth workstation to create an operative plan. I had to choose an entrance point in the right upper temporal region and even with this, I was forced to drill at somewhat of an angle. The entrance point and target points were identified with care taken not to pass over any pial surfaces on the plan. A small amount of hair was shaved. A dot was made and we prepped and draped in usual sterile fashion. Local anesthetic was infiltrated. A stab incision with a 15 blade knife was made. The Stealth biopsy device had been registered and previously aimed. The hand drill twist drill with battery power was used to make a twist drill hole along our trajectory and the needle was then passed and a few biopsies were taken superficial at the lesion and deep.   The last biopsy, some bleeding was encountered. I injected 1 mL of thrombin and waited for a time. No further bleeding was identified. Frozen section was sent and permanent section was sent as well. The needle was then withdrawn and closure with a single 3-0 nylon. Antibiotic ointment was placed as a dressing. The patient was then extubated in the operating room, taken to recovery in stable condition.       MD OSITO Kern / LEILANI  D: 08/02/2018 11:45     T: 08/03/2018 10:08  JOB #: [id number]

## 2018-08-03 NOTE — PROGRESS NOTES
[address] [place] [address] Hospitalist Division Inpatient Daily Progress Note Patient: Mahamed Daniels MRN: [id number]  CSN: [id number] YOB: 1985  Age: 34 y.o. Sex: female DOA: 8/2/2018 LOS:  LOS: 1 day Chief Complaint:  S/p Image guided stereotactic biopsy- Right insular lesion Subjective:  
  
Nausea/vomting controlled Objective:  
  
Visit Vitals  BP 92/52  Pulse (!) 56  Temp 98.3 °F (36.8 °C)  Resp 16  
 Ht 5' 3\" (1.6 m)  Wt 64.9 kg (143 lb)  SpO2 99%  BMI 25.33 kg/m2 Physical Exam: 
General appearance: alert, cooperative, no distress, appears stated age Head: Sclerae nonicteric. Oral mucous membranes moist. Scalp incision CDI Lungs: clear to auscultation bilaterally Heart: regular rate and rhythm, S1, S2 normal, no murmur, click, rub or gallop Abdomen: soft, non tender, non distended. Normoactive bowel sounds Extremities: extremities normal, atraumatic, no cyanosis or edema Skin: Skin color, texture, turgor normal. No rashes or lesions Neurologic: Grossly normal 
PSY: Mood and affect normal, appropriately behaved Intake and Output: 
Current Shift:    
Last three shifts:  08/01 1901 - 08/03 0700 In: 2346 [P.O.:480; I.V.:1795] Out: 925 [Urine:925] Recent Results (from the past 24 hour(s)) GLUCOSE, POC Collection Time: 08/02/18 11:59 AM  
Result Value Ref Range Glucose (POC) 130 (H) 70 - 110 mg/dL GLUCOSE, POC Collection Time: 08/03/18 12:12 AM  
Result Value Ref Range Glucose (POC) 147 (H) 70 - 110 mg/dL Lab Results Component Value Date/Time Glucose 87 07/25/2018 02:37 PM  
 Glucose 91 11/02/2017 10:32 AM  
 Glucose 117 (H) 10/19/2017 10:20 AM  
 Glucose 89 09/02/2015 02:42 AM  
 Glucose 83 05/01/2014 07:50 PM  
  
 
Assessment/Plan:  
 
Patient Active Problem List  
Diagnosis Code  Fibroid D25.9  Biliary dyskinesia K82.8  Brain tumor ([facility] [address] D49.6  Asthma J45.909  Epilepsy ([facility] [address] G40.909  [facility] R56.9  Migraine syndrome G43.909 A/P: 
S/p Image guided stereotactic biopsy- Right insular lesion 
- Pain management and mobilization per NS 
  
Seizure disorder 
- seizure precautions - AED per NS  
  
Migraines - Topamax - Treximet  
  
Asthma 
- Singulair - Advair - Albuterol  
  
DVT Prophylaxis - SCDs  
  
Clear for discharge from Medicine standpoint Andreea Gerber, [facility] Pager:  [phone] Office:  [phone]

## 2018-08-03 NOTE — PROGRESS NOTES
2000 Assumed care of the pt, assessments completed and charted. Resting quietly in the bed, VSS, mild headache pain reported. Discussed pain management plan with the pt, she agrees to the plan. 2100 Pt desires to get OOB to bedside commode. Assessed her ability to stand, no dizziness or gait issues witnessed. 0700 Pts neuro status has remained unchanged throughout the evening, VSS, pain is at tolerable levels. Bedside and Verbal shift change report given to Estefany Grady RN (oncoming nurse) by Yesenia Webb RN (offgoing nurse).  Report included the following information SBAR, Kardex, Intake/Output, MAR, Recent Results and Cardiac Rhythm SB.

## 2018-08-03 NOTE — DISCHARGE INSTRUCTIONS
DISCHARGE SUMMARY from Nurse    PATIENT INSTRUCTIONS:    After general anesthesia or intravenous sedation, for 24 hours or while taking prescription Narcotics:  · Limit your activities  · Do not drive and operate hazardous machinery  · Do not make important personal or business decisions  · Do  not drink alcoholic beverages  · If you have not urinated within 8 hours after discharge, please contact your surgeon on call. Report the following to your surgeon:  · Excessive pain, swelling, redness or odor of or around the surgical area  · Temperature over 100.5  · Nausea and vomiting lasting longer than 4 hours or if unable to take medications  · Any signs of decreased circulation or nerve impairment to extremity: change in color, persistent  numbness, tingling, coldness or increase pain  · Any questions    What to do at Home:      *  Please give a list of your current medications to your Primary Care Provider. *  Please update this list whenever your medications are discontinued, doses are      changed, or new medications (including over-the-counter products) are added. *  Please carry medication information at all times in case of emergency situations. These are general instructions for a healthy lifestyle:    No smoking/ No tobacco products/ Avoid exposure to second hand smoke  Surgeon General's Warning:  Quitting smoking now greatly reduces serious risk to your health. Obesity, smoking, and sedentary lifestyle greatly increases your risk for illness    A healthy diet, regular physical exercise & weight monitoring are important for maintaining a healthy lifestyle    You may be retaining fluid if you have a history of heart failure or if you experience any of the following symptoms:  Weight gain of 3 pounds or more overnight or 5 pounds in a week, increased swelling in our hands or feet or shortness of breath while lying flat in bed.   Please call your doctor as soon as you notice any of these symptoms; do not wait until your next office visit. Recognize signs and symptoms of STROKE:    F-face looks uneven    A-arms unable to move or move unevenly    S-speech slurred or non-existent    T-time-call 911 as soon as signs and symptoms begin-DO NOT go       Back to bed or wait to see if you get better-TIME IS BRAIN. Warning Signs of HEART ATTACK     Call 911 if you have these symptoms:   Chest discomfort. Most heart attacks involve discomfort in the center of the chest that lasts more than a few minutes, or that goes away and comes back. It can feel like uncomfortable pressure, squeezing, fullness, or pain.  Discomfort in other areas of the upper body. Symptoms can include pain or discomfort in one or both arms, the back, neck, jaw, or stomach.  Shortness of breath with or without chest discomfort.  Other signs may include breaking out in a cold sweat, nausea, or lightheadedness. Don't wait more than five minutes to call 911 - MINUTES MATTER! Fast action can save your life. Calling 911 is almost always the fastest way to get lifesaving treatment. Emergency Medical Services staff can begin treatment when they arrive -- up to an hour sooner than if someone gets to the hospital by car. The discharge information has been reviewed with the patient. The patient verbalized understanding. Discharge medications reviewed with the patient and appropriate educational materials and side effects teaching were provided. Patient armband removed and shredded    ___________________________________________________________________________________________________________________________________[facility]. [address]  Phone:  [phone]  Fax:  [phone]    Dr. Jaden Hanley    Discharge Instructions: With your recent surgery it is not unusual to experience some pain or discomfort in the area of previous pain or the incision.   Accordingly, you have been given pain medication for your comfort until the healing process is further along. As time progresses, you should notice the frequency and the intensity of your discomfort steadily decrease. Here are some simple post-operative instructions to help during your home convalescence. 6. Use your pain medication as directed. Refills should be requested during regular office hours and not on weekends by calling [phone]. 7. Continue any regular medicine for other conditions (e.g. blood pressure, diabetes, heart, heart problems, etc.)    8. You may ride in a car for short trips. Dr. Sarika Martins will discuss with you when you can drive. 9. No bending, lifting or strenuous activities. If you need to get to the floor, squat instead of bending. 10. Showers are fine and you may wash your hair. 11. Avoid exercises except for walking. Begin with frequent, but short, periods of walking and gradually build up to longer periods of time. 12. Sit for short periods of time (10-15 minutes) in the first week after surgery. 15. You may resume sexual relations as comfort level allows. 15. Notify us if you develop fever, painful redness around the incision or drainage from the wound. 8.  Dr. [facility]  will be calling to provide exact date and time for follow-up appointment. She will also make arrangements for restrictions needed in order to return to work.               If you have any questions, please contact our office at [phone]   Thank You      Patient Signature: ________________________  Date: August 3, 2018

## 2018-08-03 NOTE — PROGRESS NOTES
Problem: Falls - Risk of 
Goal: *Absence of Falls Document Delisa Velez Fall Risk and appropriate interventions in the flowsheet. Outcome: Progressing Towards Goal 
Fall Risk Interventions: 
Mobility Interventions: Assess mobility with egress test, Patient to call before getting OOB Medication Interventions: Bed/chair exit alarm, Patient to call before getting OOB, Teach patient to arise slowly Elimination Interventions: Bed/chair exit alarm, Call light in reach, Patient to call for help with toileting needs Problem: Pressure Injury - Risk of 
Goal: *Prevention of pressure injury Document Evert Scale and appropriate interventions in the flowsheet. Outcome: Progressing Towards Goal 
Pressure Injury Interventions: Activity Interventions: Pressure redistribution bed/mattress(bed type) Mobility Interventions: HOB 30 degrees or less Nutrition Interventions: Document food/fluid/supplement intake

## 2018-08-03 NOTE — DISCHARGE SUMMARY
Discharge Summary    Patient: Shola Klein               Sex: female          DOA: 8/2/2018         YOB: 1985      Age:  34 y.o.        LOS:  LOS: 1 day           Admit Date: 8/2/2018    Discharge Date: 8/3/2018    Consults: Hospitalist    Admission Diagnoses: brain tumor  d43.8  Brain tumor [facility])    Discharge Diagnoses:    Problem List as of 8/3/2018  Date Reviewed: 4/25/2018          Codes Class Noted - Resolved    * (Principal)Brain tumor ([facility].) ICD-10-CM: D49.6  ICD-9-CM: 239.6  8/2/2018 - Present        Asthma (Chronic) ICD-10-CM: J45.909  ICD-9-CM: 493.90  8/2/2018 - Present        Epilepsy ([facility] (Chronic) ICD-10-CM: G40.909  ICD-9-CM: 345.90  8/2/2018 - Present        Seizures ([facility] (Chronic) ICD-10-CM: R56.9  ICD-9-CM: 780.39  8/2/2018 - Present        Migraine syndrome (Chronic) ICD-10-CM: F14.016  ICD-9-CM: 346.00  8/2/2018 - Present        Biliary dyskinesia ICD-10-CM: K82.8  ICD-9-CM: 575.8  10/27/2017 - Present        Fibroid ICD-10-CM: D25.9  ICD-9-CM: 218.9  5/21/2014 - Present        RESOLVED: Active labor at term ICD-10-CM: Melvena Leaf  ICD-9-CM: Melvena Leaf  9/26/2014 - 10/9/2014        RESOLVED: Normal first pregnancy pregnancy confirmed ICD-10-CM: Z34.00  ICD-9-CM: V22.0  9/25/2014 - 10/9/2014        RESOLVED: Supervision of normal first pregnancy ICD-10-CM: Z34.00  ICD-9-CM: V22.0  5/21/2014 - 10/9/2014              Allergies   Allergen Reactions    Latex Contact Dermatitis    Dilantin [Phenytoin Sodium Extended] Anaphylaxis    Phenobarbital Anaphylaxis          Operative Procedure Performed: Procedure(s):  image guided STEREOTACTIC biopsy right insular lesion     Data:   Patient Vitals for the past 12 hrs:   Temp Pulse Resp BP SpO2   08/03/18 1000 - (!) 56 18 91/52 100 %   08/03/18 0901 - 73 26 - 94 %   08/03/18 0900 - 60 21 (!) 88/56 -   08/03/18 0824 99.4 °F (37.4 °C) (!) 54 12 94/58 99 %   08/03/18 0800 - (!) 56 16 - 99 %   08/03/18 0700 - (!) 44 14 92/52 99 %   08/03/18 0600 - (!) 59 17 97/60 -   08/03/18 0500 - (!) 46 16 (!) 83/44 98 %   08/03/18 0400 98.3 °F (36.8 °C) (!) 51 22 91/54 99 %   08/03/18 0300 - (!) 44 15 (!) 87/56 98 %   08/03/18 0200 - 69 22 102/65 99 %   08/03/18 0100 - (!) 47 14 92/52 98 %   08/03/18 0000 98.2 °F (36.8 °C) (!) 49 12 92/51 98 %       HPI:  Ms. Kenyatta Escoto  is a 34 y.o.  female with PMH of migraine, asthma, seizures, and fibroids who is followed by Dr. Ivy Jones    as an outpatient for an incidental lesion in the right anterior insula. It does not enhance. .    The patient elected to undergo the above named procedure as intervention. Patient did obtain medical clearance from their primary care provider prior to undergoing elective surgery. Hospital Course: Ms. Kenyatta Escoto was admitted to the hospital on 8/2/2018  6:12 AM .  The patient underwent the above named procedure. Remained hemodynamically stable during their hospitalization. Received appropriate pre and post operative prophylactic antibiotics as well as maintained on SCDs. Physical Exam:  Neurologic: Mental status: alertness: alert. Oriented x 4. Speech clear and appropriate. Eyes: conjunctivae/corneas clear. PERRL, EOM's intact. Visual fields were intact to confrontation  Eye movements were full and conjugate, saccades were accurate, pursuit movements were smooth, and there was no nystagmus. The palate and tongue moved in the midline. Motor: 5/5 throughtout  Skin: Incision to right frontal with suture intact, edges well approximated, no erythema, no edema, no drainage, no ecchymosis, and no hematoma. Pathology pending. Condition at discharge is good. Pain was controlled with prn analgesics. Discharge Medications:     Current Discharge Medication List      START taking these medications    Details   oxyCODONE-acetaminophen (PERCOCET) 5-325 mg per tablet Take 1 Tab by mouth every six (6) hours as needed.  Max Daily Amount: 4 Tabs. Indications: Pain  Qty: 20 Tab, Refills: 0    Associated Diagnoses: S/P craniotomy      ondansetron (ZOFRAN ODT) 4 mg disintegrating tablet Take 1 Tab by mouth every eight (8) hours as needed for Nausea. Indications: PREVENTION OF POST-OPERATIVE NAUSEA AND VOMITING  Qty: 6 Tab, Refills: 0         CONTINUE these medications which have NOT CHANGED    Details   ADVAIR DISKUS 100-50 mcg/dose diskus inhaler Take 1 Puff by inhalation two (2) times a day. montelukast (SINGULAIR) 10 mg tablet Take 10 mg by mouth nightly. mv,Ca,min-iron-FA-guarana-caff (ONE-A-DAY WOMEN'S ACTIVE) 18 mg iron- 400 mcg-180 mg tab Take  by mouth. topiramate (TOPAMAX) 100 mg tablet Take 1 Tab by mouth nightly. Qty: 90 Tab, Refills: 1      SUMAtriptan-naproxen (TREXIMET)  mg tablet Take one tablet at HA onset, may repeat >2 hr x 1. Max 2 tabs in 24 hours  Qty: 9 Tab, Refills: 5      albuterol (PROVENTIL VENTOLIN) 2.5 mg /3 mL (0.083 %) nebulizer solution 3 mL by Nebulization route every four (4) hours as needed for Wheezing. Qty: 24 Each, Refills: 0      levonorgestrel (MIRENA) 20 mcg/24 hr (5 years) IUD 1 Device by IntraUTERine route once. Discharge Instructions: With your recent surgery it is not unusual to experience some pain or discomfort in the area of previous pain or the incision. Accordingly, you have been given pain medication for your comfort until the healing process is further along. As time progresses, you should notice the frequency and the intensity of your discomfort steadily decrease. Here are some simple post-operative instructions to help during your home convalescence. 1. Use your pain medication as directed. Refills should be requested during regular office hours and not on weekends by calling [phone]. 2. Continue any regular medicine for other conditions (e.g. blood pressure, diabetes, heart, heart problems, etc.)    3. You may ride in a car for short trips.    Sylvester Closs will discuss with you when you can drive. 4. No bending, lifting or strenuous activities. If you need to get to the floor, squat instead of bending. 5. Showers are fine and you may wash your hair. 6. Avoid exercises except for walking. Begin with frequent, but short, periods of walking and gradually build up to longer periods of time. 7. Sit for short periods of time (10-15 minutes) in the first week after surgery. 8. You may resume sexual relations as comfort level allows. 9. Notify us if you develop fever, painful redness around the incision or drainage from the wound. 8.  Dr. Ayaka Curtis  will be calling to provide exact date and time for follow-up appointment. She will also make arrangements for restrictions needed in order to return to work. Discharge Disposition:  Patient is medically stable for discharge to home from hospital with above discharge medications, instructions, and follow up care. Discharge assessement and plan made with Dr. Sylvester Closs.         CC:  PCP: Jennifer Gama MD

## 2018-08-03 NOTE — PROGRESS NOTES
Reason for Admission:   image guided STEREOTACTIC biopsy right insular lesion 
  
SURGERY DATE: 8/2/2018 Dx:  brain tumor  d43.8 RRAT Score:   3 Plan for utilizing home health:  As indicated Likelihood of Readmission:  high Transition of Care Plan:   Home Interviewed patient, she agrees to share her discharge information with her spouse, Abraham Diez . Demographgic information verified. She was independent prior to admission and sees Dr Claudean Silk for her primary care needs. Her discharge plan is to return home. Care Management Interventions PCP Verified by CM: Yes 
Palliative Care Criteria Met (RRAT>21 & CHF Dx)?: No 
Mode of Transport at Discharge: Other (see comment) (spouse) Transition of Care Consult (CM Consult): Other (home) MyChart Signup: No 
Discharge Durable Medical Equipment: No 
Health Maintenance Reviewed: Yes Physical Therapy Consult: No 
Occupational Therapy Consult: No 
Speech Therapy Consult: No 
Current Support Network: Lives with Spouse Confirm Follow Up Transport: Family Plan discussed with Pt/Family/Caregiver: Yes [employer] Information Provided?: No 
Discharge Location Discharge Placement: Home

## 2018-08-03 NOTE — PROGRESS NOTES
Care Management Interventions PCP Verified by CM: Yes 
Palliative Care Criteria Met (RRAT>21 & CHF Dx)?: No 
Mode of Transport at Discharge: Other (see comment) (spouse) Transition of Care Consult (CM Consult): Other (home) MyChart Signup: No 
Discharge Durable Medical Equipment: No 
Health Maintenance Reviewed: Yes Physical Therapy Consult: No 
Occupational Therapy Consult: No 
Speech Therapy Consult: No 
Current Support Network: Lives with Spouse Confirm Follow Up Transport: Family Plan discussed with Pt/Family/Caregiver: Yes [employer] Information Provided?: No 
Discharge Location Discharge Placement: Home Patient to follow up with Neuro surgery in one week.

## (undated) DEVICE — FLEX ADVANTAGE 1500CC: Brand: FLEX ADVANTAGE

## (undated) DEVICE — CATHETER IV 16GA L1.25IN POLYUR STR GRY HUB SFTY RADPQ DISP

## (undated) DEVICE — FLEXIBLE ADHESIVE BANDAGE,X-LARGE: Brand: CURITY

## (undated) DEVICE — SUT ETHLN 3-0 18IN PS1 BLK --

## (undated) DEVICE — TRAY PREP DRY W/ PREM GLV 2 APPL 6 SPNG 2 UNDPD 1 OVERWRAP

## (undated) DEVICE — SUTURE VCRL SZ 0 L27IN ABSRB UD L26MM CT-2 1/2 CIR J270H

## (undated) DEVICE — SPINE PACK DEPAUL: Brand: MEDLINE INDUSTRIES, INC.

## (undated) DEVICE — REM POLYHESIVE ADULT PATIENT RETURN ELECTRODE: Brand: VALLEYLAB

## (undated) DEVICE — SYR LR LCK 1ML GRAD NSAF 30ML --

## (undated) DEVICE — STERILE POLYISOPRENE POWDER-FREE SURGICAL GLOVES: Brand: PROTEXIS

## (undated) DEVICE — TRAP MUCUS SPECIMEN 40ML -- MEDICHOICE

## (undated) DEVICE — LAPAROSCOPIC SMOKE FILTRATION SYSTEM: Brand: PALL LAPAROSHIELD® PLUS LAPAROSCOPIC SMOKE FILTRATION SYSTEM

## (undated) DEVICE — TOWEL: OR BLU 80/CS: Brand: MEDICAL ACTION INDUSTRIES

## (undated) DEVICE — INTENDED FOR TISSUE SEPARATION, AND OTHER PROCEDURES THAT REQUIRE A SHARP SURGICAL BLADE TO PUNCTURE OR CUT.: Brand: BARD-PARKER ® DISPOSABLE SCALPELS

## (undated) DEVICE — 2, DISPOSABLE SUCTION/IRRIGATOR WITH DISPOSABLE TIP: Brand: STRYKEFLOW

## (undated) DEVICE — CRANIOTOMY DRAPE, STERILE: Brand: MEDLINE

## (undated) DEVICE — ENDOCUT SCISSOR TIP, DISPOSABLE: Brand: RENEW

## (undated) DEVICE — NDL SPNE LR LCK 18GX6IN PNK --

## (undated) DEVICE — CATHETER IV 14GA L1.25IN ORNG POLY SFTY SYS FULL ENCASED

## (undated) DEVICE — BLADED TROCAR WITH FIXATION CANNULA: Brand: VERSAONE

## (undated) DEVICE — STAPLER SKIN H3.9MM WIRE DIA0.58MM CRWN 6.9MM 35 STPL FIX

## (undated) DEVICE — SUTURE VCRL SZ 3-0 L27IN ABSRB UD L26MM SH 1/2 CIR J416H

## (undated) DEVICE — 3M™ BAIR PAWS FLEX™ WARMING GOWN, STANDARD, 20 PER CASE 81003: Brand: BAIR PAWS™

## (undated) DEVICE — SYRINGE MED 3ML NDL 22GA L1 1/2IN REG BVL SFGLDE

## (undated) DEVICE — SOLUTION IRRIG 1000ML H2O STRL BLT

## (undated) DEVICE — INTENDED FOR TISSUE SEPARATION, AND OTHER PROCEDURES THAT REQUIRE A SHARP SURGICAL BLADE TO PUNCTURE OR CUT.: Brand: BARD-PARKER SAFETY BLADES SIZE 11, STERILE

## (undated) DEVICE — SUTURE MCRYL SZ 4-0 L18IN ABSRB UD L19MM PS-2 3/8 CIR PRIM Y496G

## (undated) DEVICE — SOLUTION IV 1000ML 0.9% SOD CHL

## (undated) DEVICE — (D)STRIP SKN CLSR 0.5X4IN WHT --

## (undated) DEVICE — BLUNT TROCAR WITH THREADED ANCHOR: Brand: VERSAONE

## (undated) DEVICE — STERILE POLYISOPRENE POWDER-FREE SURGICAL GLOVES WITH EMOLLIENT COATING: Brand: PROTEXIS

## (undated) DEVICE — (D)SYR 10ML SLIP TIP 1/5ML GRD -- DISC BY MFR USE ITEM 338000

## (undated) DEVICE — TOOL 9MH30 LEGEND 9CM 3MM MH: Brand: MIDAS REX

## (undated) DEVICE — DRAPE TWL SURG 16X26IN BLU ORB04] ALLCARE INC]

## (undated) DEVICE — CATHETER DRNGE 32FR 4 WNG DISP FOR NEPHSTMY MALECOTS

## (undated) DEVICE — SCISSORS LAPSCP L33CM DIA5MM CVD MULTIUSE HANDHELD

## (undated) DEVICE — SKIN MARKER,REGULAR TIP WITH RULER AND LABELS: Brand: DEVON

## (undated) DEVICE — KENDALL SCD EXPRESS SLEEVES, KNEE LENGTH, MEDIUM: Brand: KENDALL SCD

## (undated) DEVICE — GAUZE SPONGES,8 PLY: Brand: CURITY

## (undated) DEVICE — (D)BNDG ADHESIVE FABRIC 3/4X3 -- DISC BY MFR USE ITEM 357960

## (undated) DEVICE — THYROID SHEET: Brand: CONVERTORS

## (undated) DEVICE — Device

## (undated) DEVICE — 3M™ IOBAN™ 2 ANTIMICROBIAL INCISE DRAPE 6650EZ: Brand: IOBAN™ 2

## (undated) DEVICE — SHEARS: Brand: ENDO SHEARS

## (undated) DEVICE — INTENDED FOR TISSUE SEPARATION, AND OTHER PROCEDURES THAT REQUIRE A SHARP SURGICAL BLADE TO PUNCTURE OR CUT.: Brand: BARD-PARKER SAFETY BLADES SIZE 15, STERILE

## (undated) DEVICE — (D)SYR 10ML 1/5ML GRAD NSAF -- PKGING CHANGE USE ITEM 338027

## (undated) DEVICE — SPECIMEN RETRIEVAL POUCH: Brand: ENDO CATCH GOLD

## (undated) DEVICE — PASSIVE SPHERES

## (undated) DEVICE — BIOPSY NEEDLE KIT 9733068 PASSIVE

## (undated) DEVICE — CATHETER IV 10GA L3IN OD3.3-3.5MM ID2.64-2.74MM BRN FEP

## (undated) DEVICE — SYR 10ML LUER LOK 1/5ML GRAD --

## (undated) DEVICE — 3M™ STERI-STRIP™ COMPOUND BENZOIN TINCTURE 40 BAGS/CARTON 4 CARTONS/CASE C1544: Brand: 3M™ STERI-STRIP™

## (undated) DEVICE — GOWN,NON-REINFORCED,XXL: Brand: MEDLINE

## (undated) DEVICE — SOLUTION LACTATED RINGERS INJECTION USP

## (undated) DEVICE — KIT CLN UP BON SECOURS MARYV

## (undated) DEVICE — MAYFIELD® DISPOSABLE ADULT SKULL PIN (PLASTIC BASE): Brand: MAYFIELD®

## (undated) DEVICE — SYR 10ML CTRL LR LCK NSAF LF --

## (undated) DEVICE — NDL SPNE QNCKE 18GX3.5IN LF --

## (undated) DEVICE — NEEDLE HYPO 25GA L1.5IN BVL ORIENTED ECLIPSE

## (undated) DEVICE — PREP SKN DURAPREP 26ML APPL --

## (undated) DEVICE — SUTURE SZ 0 27IN 5/8 CIR UR-6  TAPER PT VIOLET ABSRB VICRYL J603H

## (undated) DEVICE — TOWEL SURG W16XL26IN BLU NONFENESTRATED DLX ST 2 PER PK

## (undated) DEVICE — NEEDLE HYPO 22GA L1.5IN BLK S STL HUB POLYPR SHLD REG BVL

## (undated) DEVICE — TRAJ GUIDE KIT, 9733065, BIOPSY, EXT